# Patient Record
Sex: FEMALE | Race: BLACK OR AFRICAN AMERICAN | NOT HISPANIC OR LATINO
[De-identification: names, ages, dates, MRNs, and addresses within clinical notes are randomized per-mention and may not be internally consistent; named-entity substitution may affect disease eponyms.]

---

## 2018-01-25 ENCOUNTER — APPOINTMENT (OUTPATIENT)
Dept: BARIATRICS | Facility: CLINIC | Age: 33
End: 2018-01-25
Payer: MEDICAID

## 2018-01-25 VITALS
HEIGHT: 66 IN | OXYGEN SATURATION: 99 % | SYSTOLIC BLOOD PRESSURE: 123 MMHG | DIASTOLIC BLOOD PRESSURE: 80 MMHG | BODY MASS INDEX: 47.09 KG/M2 | TEMPERATURE: 98.1 F | HEART RATE: 97 BPM | WEIGHT: 293 LBS

## 2018-01-25 PROCEDURE — 99203 OFFICE O/P NEW LOW 30 MIN: CPT

## 2018-02-14 ENCOUNTER — FORM ENCOUNTER (OUTPATIENT)
Age: 33
End: 2018-02-14

## 2018-02-15 ENCOUNTER — APPOINTMENT (OUTPATIENT)
Dept: BARIATRICS | Facility: CLINIC | Age: 33
End: 2018-02-15

## 2018-02-15 ENCOUNTER — APPOINTMENT (OUTPATIENT)
Dept: RADIOLOGY | Facility: HOSPITAL | Age: 33
End: 2018-02-15

## 2018-02-15 ENCOUNTER — OUTPATIENT (OUTPATIENT)
Dept: OUTPATIENT SERVICES | Facility: HOSPITAL | Age: 33
LOS: 1 days | End: 2018-02-15
Payer: COMMERCIAL

## 2018-02-15 VITALS — HEIGHT: 66 IN | WEIGHT: 293 LBS | BODY MASS INDEX: 47.09 KG/M2

## 2018-02-15 PROCEDURE — 74241: CPT | Mod: 26

## 2018-02-15 PROCEDURE — 74241: CPT

## 2018-03-19 ENCOUNTER — APPOINTMENT (OUTPATIENT)
Dept: BARIATRICS | Facility: CLINIC | Age: 33
End: 2018-03-19

## 2020-07-29 ENCOUNTER — APPOINTMENT (OUTPATIENT)
Dept: BARIATRICS | Facility: CLINIC | Age: 35
End: 2020-07-29
Payer: COMMERCIAL

## 2020-07-29 VITALS — HEIGHT: 66 IN | WEIGHT: 293 LBS | BODY MASS INDEX: 47.09 KG/M2

## 2020-07-29 PROCEDURE — 99214 OFFICE O/P EST MOD 30 MIN: CPT

## 2020-08-06 ENCOUNTER — APPOINTMENT (OUTPATIENT)
Dept: BARIATRICS | Facility: CLINIC | Age: 35
End: 2020-08-06
Payer: COMMERCIAL

## 2020-08-06 VITALS — BODY MASS INDEX: 50.36 KG/M2 | WEIGHT: 293 LBS

## 2020-08-06 DIAGNOSIS — E66.01 MORBID (SEVERE) OBESITY DUE TO EXCESS CALORIES: ICD-10-CM

## 2020-08-06 PROCEDURE — 98968 PH1 ASSMT&MGMT NQHP 21-30: CPT

## 2020-08-19 ENCOUNTER — APPOINTMENT (OUTPATIENT)
Dept: BARIATRICS | Facility: CLINIC | Age: 35
End: 2020-08-19

## 2020-08-30 NOTE — HISTORY OF PRESENT ILLNESS
[Home] : at home, [unfilled] , at the time of the visit. [Medical Office: ([facility])___] : at the medical office located in  [Verbal consent obtained from patient] : the patient, [unfilled] [de-identified] : Patient is a 34 year old female with a long history of morbid obesity not responsive to multiple dietary regimens. She has a BMI of 50.4. she is interested in bariatric surgery.

## 2020-11-01 ENCOUNTER — TRANSCRIPTION ENCOUNTER (OUTPATIENT)
Age: 35
End: 2020-11-01

## 2020-11-02 ENCOUNTER — APPOINTMENT (OUTPATIENT)
Dept: RADIOLOGY | Facility: HOSPITAL | Age: 35
End: 2020-11-02
Payer: COMMERCIAL

## 2020-11-02 ENCOUNTER — RESULT REVIEW (OUTPATIENT)
Age: 35
End: 2020-11-02

## 2020-11-02 ENCOUNTER — OUTPATIENT (OUTPATIENT)
Dept: OUTPATIENT SERVICES | Facility: HOSPITAL | Age: 35
LOS: 1 days | End: 2020-11-02
Payer: COMMERCIAL

## 2020-11-02 PROCEDURE — 74240 X-RAY XM UPR GI TRC 1CNTRST: CPT

## 2020-11-02 PROCEDURE — 74240 X-RAY XM UPR GI TRC 1CNTRST: CPT | Mod: 26

## 2022-06-01 ENCOUNTER — NON-APPOINTMENT (OUTPATIENT)
Age: 37
End: 2022-06-01

## 2022-06-09 ENCOUNTER — APPOINTMENT (OUTPATIENT)
Dept: INTERNAL MEDICINE | Facility: CLINIC | Age: 37
End: 2022-06-09

## 2022-07-07 ENCOUNTER — NON-APPOINTMENT (OUTPATIENT)
Age: 37
End: 2022-07-07

## 2022-07-11 ENCOUNTER — APPOINTMENT (OUTPATIENT)
Dept: INTERNAL MEDICINE | Facility: CLINIC | Age: 37
End: 2022-07-11

## 2022-07-11 VITALS
OXYGEN SATURATION: 96 % | BODY MASS INDEX: 44.41 KG/M2 | SYSTOLIC BLOOD PRESSURE: 125 MMHG | HEIGHT: 68 IN | DIASTOLIC BLOOD PRESSURE: 79 MMHG | TEMPERATURE: 98.6 F | HEART RATE: 80 BPM | WEIGHT: 293 LBS | RESPIRATION RATE: 14 BRPM

## 2022-07-11 DIAGNOSIS — Z11.3 ENCOUNTER FOR SCREENING FOR INFECTIONS WITH A PREDOMINANTLY SEXUAL MODE OF TRANSMISSION: ICD-10-CM

## 2022-07-11 DIAGNOSIS — G89.29 PAIN IN LEFT KNEE: ICD-10-CM

## 2022-07-11 DIAGNOSIS — M53.86 OTHER SPECIFIED DORSOPATHIES, LUMBAR REGION: ICD-10-CM

## 2022-07-11 DIAGNOSIS — M25.562 PAIN IN LEFT KNEE: ICD-10-CM

## 2022-07-11 DIAGNOSIS — Z80.0 FAMILY HISTORY OF MALIGNANT NEOPLASM OF DIGESTIVE ORGANS: ICD-10-CM

## 2022-07-11 DIAGNOSIS — M48.061 SPINAL STENOSIS, LUMBAR REGION WITHOUT NEUROGENIC CLAUDICATION: ICD-10-CM

## 2022-07-11 DIAGNOSIS — G43.009 MIGRAINE W/OUT AURA, NOT INTRACTABLE, W/OUT STATUS MIGRAINOSUS: ICD-10-CM

## 2022-07-11 PROCEDURE — 36415 COLL VENOUS BLD VENIPUNCTURE: CPT

## 2022-07-11 PROCEDURE — 99385 PREV VISIT NEW AGE 18-39: CPT | Mod: 25,GC

## 2022-07-12 ENCOUNTER — TRANSCRIPTION ENCOUNTER (OUTPATIENT)
Age: 37
End: 2022-07-12

## 2022-07-12 LAB
C TRACH RRNA SPEC QL NAA+PROBE: NOT DETECTED
HBV SURFACE AB SER QL: NONREACTIVE
HBV SURFACE AG SER QL: NONREACTIVE
HCV RNA FLD QL NAA+PROBE: NORMAL
HCV RNA SPEC QL PROBE+SIG AMP: NOT DETECTED
HIV1+2 AB SPEC QL IA.RAPID: NONREACTIVE
N GONORRHOEA RRNA SPEC QL NAA+PROBE: NOT DETECTED
SOURCE AMPLIFICATION: NORMAL
T PALLIDUM AB SER QL IA: NEGATIVE

## 2022-07-13 ENCOUNTER — APPOINTMENT (OUTPATIENT)
Dept: BARIATRICS | Facility: CLINIC | Age: 37
End: 2022-07-13

## 2022-07-13 VITALS
DIASTOLIC BLOOD PRESSURE: 72 MMHG | WEIGHT: 293 LBS | HEIGHT: 65.5 IN | SYSTOLIC BLOOD PRESSURE: 137 MMHG | TEMPERATURE: 97.7 F | HEART RATE: 85 BPM | BODY MASS INDEX: 48.23 KG/M2 | OXYGEN SATURATION: 100 %

## 2022-07-13 PROCEDURE — 99203 OFFICE O/P NEW LOW 30 MIN: CPT

## 2022-07-13 NOTE — PLAN
[FreeTextEntry1] : Patient is a 36 year year old F  with a PMHx of obesity (BMI 55.9 ) who presents today for initial bariatric consult. She has been followed by her PCP for a long time. Her current BMI is approximately 55. She has been followed by our office for a lengthy period of time. She has tried to lose weight but has been on stand still and her weight has been increasing since 2020. She is employed and has reasonable support. No evidence of depression and has monetary means to afford supplements. Will go forward with MDS based on her BMI of 56. On exam, she has central obesity and good muscle strength.Patient will see a dietician and our psychiatrist for clearance. Will start bariatric workup. \par

## 2022-07-13 NOTE — END OF VISIT
[FreeTextEntry3] : All medical record entries made by the Scribe were at my, AMBIKA Villa , direction and personally dictated by me on 07/13/2022 . I have reviewed the chart and agree that the record accurately reflects my personal performance of the history, physical exam, assessment and plan. I have also personally directed, reviewed, and agreed with the chart.\par

## 2022-07-13 NOTE — ADDENDUM
[FreeTextEntry1] : Documented by Jose Carlos Watts acting as a scribe for AMBIKA Villa on 07/13/2022\par

## 2022-07-13 NOTE — HISTORY OF PRESENT ILLNESS
[de-identified] : Patient is a 36 year year old F  with a PMHx of obesity (BMI 55.9 ) who presents today for initial bariatric consult. Patient was previously seen by our office few years back. She works as an . Patient is not on any medication except birth control. She has a 13 year old child. Reports of menses once every 3 weeks due to the birth control. Denies reflux.  She has sciatica which she reports causes ankle swelling. She reports that she was losing weight with some diet change but regained after COVID infection last year. She experienced difficulty breathing during COVID. She reports that’s she usually seems to eat less compared to her peers and her diet consists of mostly protein shakes for breakfast and sometimes fast food as well. She exercise at least 2-3 times a week that includes sit-ups and treadmill. \par

## 2022-07-14 LAB
25(OH)D3 SERPL-MCNC: 23.4 NG/ML
ALBUMIN SERPL ELPH-MCNC: 4.5 G/DL
ALP BLD-CCNC: 72 U/L
ALT SERPL-CCNC: 14 U/L
ANION GAP SERPL CALC-SCNC: 14 MMOL/L
APO B SERPL-MCNC: 139 MG/DL
AST SERPL-CCNC: 16 U/L
BASOPHILS # BLD AUTO: 0.05 K/UL
BASOPHILS NFR BLD AUTO: 0.6 %
BILIRUB SERPL-MCNC: 0.3 MG/DL
BUN SERPL-MCNC: 13 MG/DL
CALCIUM SERPL-MCNC: 9.9 MG/DL
CHLORIDE SERPL-SCNC: 102 MMOL/L
CHOLEST SERPL-MCNC: 270 MG/DL
CO2 SERPL-SCNC: 23 MMOL/L
CREAT SERPL-MCNC: 1.02 MG/DL
CRP SERPL-MCNC: 46 MG/L
EGFR: 74 ML/MIN/1.73M2
EOSINOPHIL # BLD AUTO: 0.3 K/UL
EOSINOPHIL NFR BLD AUTO: 3.5 %
ESTIMATED AVERAGE GLUCOSE: 120 MG/DL
FOLATE SERPL-MCNC: 8.2 NG/ML
GLUCOSE BS SERPL-MCNC: 82 MG/DL
GLUCOSE SERPL-MCNC: 83 MG/DL
HBA1C MFR BLD HPLC: 5.8 %
HCT VFR BLD CALC: 39.6 %
HDLC SERPL-MCNC: 59 MG/DL
HGB BLD-MCNC: 12.8 G/DL
IMM GRANULOCYTES NFR BLD AUTO: 0.2 %
INSULIN P FAST SERPL-ACNC: 28.3 UU/ML
IRON SERPL-MCNC: 52 UG/DL
LACTATE BLDA-MCNC: 1.3 MMOL/L
LDLC SERPL CALC-MCNC: 187 MG/DL
LYMPHOCYTES # BLD AUTO: 3.11 K/UL
LYMPHOCYTES NFR BLD AUTO: 36.4 %
MAN DIFF?: NORMAL
MCHC RBC-ENTMCNC: 28.1 PG
MCHC RBC-ENTMCNC: 32.3 GM/DL
MCV RBC AUTO: 87 FL
MONOCYTES # BLD AUTO: 0.47 K/UL
MONOCYTES NFR BLD AUTO: 5.5 %
NEUTROPHILS # BLD AUTO: 4.6 K/UL
NEUTROPHILS NFR BLD AUTO: 53.8 %
NONHDLC SERPL-MCNC: 211 MG/DL
PLATELET # BLD AUTO: 405 K/UL
POTASSIUM SERPL-SCNC: 4.4 MMOL/L
PROT SERPL-MCNC: 7.4 G/DL
RBC # BLD: 4.55 M/UL
RBC # FLD: 14.4 %
SODIUM SERPL-SCNC: 139 MMOL/L
TRIGL SERPL-MCNC: 118 MG/DL
TSH SERPL-ACNC: 1.95 UIU/ML
URATE SERPL-MCNC: 6.5 MG/DL
VIT B12 SERPL-MCNC: 620 PG/ML
WBC # FLD AUTO: 8.55 K/UL

## 2022-07-15 LAB — APO LP(A) SERPL-MCNC: 9.4 NMOL/L

## 2022-07-19 LAB — VIT A SERPL-MCNC: 59.3 UG/DL

## 2022-07-22 ENCOUNTER — NON-APPOINTMENT (OUTPATIENT)
Age: 37
End: 2022-07-22

## 2022-07-22 DIAGNOSIS — R79.89 OTHER SPECIFIED ABNORMAL FINDINGS OF BLOOD CHEMISTRY: ICD-10-CM

## 2022-07-22 RX ORDER — ERGOCALCIFEROL 1.25 MG/1
1.25 MG CAPSULE, LIQUID FILLED ORAL
Qty: 12 | Refills: 0 | Status: ACTIVE | COMMUNITY
Start: 2022-07-22 | End: 1900-01-01

## 2022-07-25 LAB — VIT B1 SERPL-MCNC: 121.7 NMOL/L

## 2022-08-03 ENCOUNTER — APPOINTMENT (OUTPATIENT)
Dept: RADIOLOGY | Facility: HOSPITAL | Age: 37
End: 2022-08-03

## 2022-08-03 ENCOUNTER — OUTPATIENT (OUTPATIENT)
Dept: OUTPATIENT SERVICES | Facility: HOSPITAL | Age: 37
LOS: 1 days | End: 2022-08-03
Payer: COMMERCIAL

## 2022-08-03 ENCOUNTER — APPOINTMENT (OUTPATIENT)
Dept: INTERNAL MEDICINE | Facility: CLINIC | Age: 37
End: 2022-08-03

## 2022-08-03 PROCEDURE — 74240 X-RAY XM UPR GI TRC 1CNTRST: CPT

## 2022-08-03 PROCEDURE — 74240 X-RAY XM UPR GI TRC 1CNTRST: CPT | Mod: 26

## 2022-08-08 ENCOUNTER — NON-APPOINTMENT (OUTPATIENT)
Age: 37
End: 2022-08-08

## 2022-08-08 ENCOUNTER — APPOINTMENT (OUTPATIENT)
Dept: BARIATRICS | Facility: CLINIC | Age: 37
End: 2022-08-08

## 2022-08-15 ENCOUNTER — APPOINTMENT (OUTPATIENT)
Dept: INTERNAL MEDICINE | Facility: CLINIC | Age: 37
End: 2022-08-15

## 2022-08-16 ENCOUNTER — APPOINTMENT (OUTPATIENT)
Dept: BARIATRICS | Facility: CLINIC | Age: 37
End: 2022-08-16

## 2022-08-16 VITALS — WEIGHT: 293 LBS | BODY MASS INDEX: 53.75 KG/M2

## 2022-09-15 ENCOUNTER — APPOINTMENT (OUTPATIENT)
Dept: BARIATRICS | Facility: CLINIC | Age: 37
End: 2022-09-15

## 2022-09-15 VITALS — BODY MASS INDEX: 53.1 KG/M2 | WEIGHT: 293 LBS

## 2022-09-21 ENCOUNTER — OUTPATIENT (OUTPATIENT)
Dept: OUTPATIENT SERVICES | Facility: HOSPITAL | Age: 37
LOS: 1 days | End: 2022-09-21
Payer: COMMERCIAL

## 2022-09-21 ENCOUNTER — RESULT REVIEW (OUTPATIENT)
Age: 37
End: 2022-09-21

## 2022-09-21 DIAGNOSIS — Z01.818 ENCOUNTER FOR OTHER PREPROCEDURAL EXAMINATION: ICD-10-CM

## 2022-09-21 LAB
ALBUMIN SERPL ELPH-MCNC: 4.5 G/DL — SIGNIFICANT CHANGE UP (ref 3.3–5)
ALP SERPL-CCNC: 92 U/L — SIGNIFICANT CHANGE UP (ref 40–120)
ALT FLD-CCNC: 18 U/L — SIGNIFICANT CHANGE UP (ref 10–45)
ANION GAP SERPL CALC-SCNC: 10 MMOL/L — SIGNIFICANT CHANGE UP (ref 5–17)
APPEARANCE UR: CLEAR — SIGNIFICANT CHANGE UP
APTT BLD: 32.1 SEC — SIGNIFICANT CHANGE UP (ref 27.5–35.5)
AST SERPL-CCNC: 18 U/L — SIGNIFICANT CHANGE UP (ref 10–40)
BASOPHILS # BLD AUTO: 0.03 K/UL — SIGNIFICANT CHANGE UP (ref 0–0.2)
BASOPHILS NFR BLD AUTO: 0.4 % — SIGNIFICANT CHANGE UP (ref 0–2)
BILIRUB SERPL-MCNC: 0.5 MG/DL — SIGNIFICANT CHANGE UP (ref 0.2–1.2)
BILIRUB UR-MCNC: NEGATIVE — SIGNIFICANT CHANGE UP
BUN SERPL-MCNC: 10 MG/DL — SIGNIFICANT CHANGE UP (ref 7–23)
CALCIUM SERPL-MCNC: 9.6 MG/DL — SIGNIFICANT CHANGE UP (ref 8.4–10.5)
CHLORIDE SERPL-SCNC: 104 MMOL/L — SIGNIFICANT CHANGE UP (ref 96–108)
CO2 SERPL-SCNC: 26 MMOL/L — SIGNIFICANT CHANGE UP (ref 22–31)
COLOR SPEC: YELLOW — SIGNIFICANT CHANGE UP
CREAT SERPL-MCNC: 0.96 MG/DL — SIGNIFICANT CHANGE UP (ref 0.5–1.3)
DIFF PNL FLD: NEGATIVE — SIGNIFICANT CHANGE UP
EGFR: 79 ML/MIN/1.73M2 — SIGNIFICANT CHANGE UP
EOSINOPHIL # BLD AUTO: 0.25 K/UL — SIGNIFICANT CHANGE UP (ref 0–0.5)
EOSINOPHIL NFR BLD AUTO: 3.4 % — SIGNIFICANT CHANGE UP (ref 0–6)
GLUCOSE SERPL-MCNC: 90 MG/DL — SIGNIFICANT CHANGE UP (ref 70–99)
GLUCOSE UR QL: NEGATIVE — SIGNIFICANT CHANGE UP
HCT VFR BLD CALC: 38.5 % — SIGNIFICANT CHANGE UP (ref 34.5–45)
HGB BLD-MCNC: 12.6 G/DL — SIGNIFICANT CHANGE UP (ref 11.5–15.5)
IMM GRANULOCYTES NFR BLD AUTO: 0.3 % — SIGNIFICANT CHANGE UP (ref 0–0.9)
INR BLD: 1.07 — SIGNIFICANT CHANGE UP (ref 0.88–1.16)
KETONES UR-MCNC: NEGATIVE — SIGNIFICANT CHANGE UP
LEUKOCYTE ESTERASE UR-ACNC: NEGATIVE — SIGNIFICANT CHANGE UP
LYMPHOCYTES # BLD AUTO: 2.26 K/UL — SIGNIFICANT CHANGE UP (ref 1–3.3)
LYMPHOCYTES # BLD AUTO: 31 % — SIGNIFICANT CHANGE UP (ref 13–44)
MCHC RBC-ENTMCNC: 28.4 PG — SIGNIFICANT CHANGE UP (ref 27–34)
MCHC RBC-ENTMCNC: 32.7 GM/DL — SIGNIFICANT CHANGE UP (ref 32–36)
MCV RBC AUTO: 86.7 FL — SIGNIFICANT CHANGE UP (ref 80–100)
MONOCYTES # BLD AUTO: 0.54 K/UL — SIGNIFICANT CHANGE UP (ref 0–0.9)
MONOCYTES NFR BLD AUTO: 7.4 % — SIGNIFICANT CHANGE UP (ref 2–14)
NEUTROPHILS # BLD AUTO: 4.2 K/UL — SIGNIFICANT CHANGE UP (ref 1.8–7.4)
NEUTROPHILS NFR BLD AUTO: 57.5 % — SIGNIFICANT CHANGE UP (ref 43–77)
NITRITE UR-MCNC: NEGATIVE — SIGNIFICANT CHANGE UP
NRBC # BLD: 0 /100 WBCS — SIGNIFICANT CHANGE UP (ref 0–0)
PH UR: 6 — SIGNIFICANT CHANGE UP (ref 5–8)
PLATELET # BLD AUTO: 395 K/UL — SIGNIFICANT CHANGE UP (ref 150–400)
POTASSIUM SERPL-MCNC: 5.1 MMOL/L — SIGNIFICANT CHANGE UP (ref 3.5–5.3)
POTASSIUM SERPL-SCNC: 5.1 MMOL/L — SIGNIFICANT CHANGE UP (ref 3.5–5.3)
PROT SERPL-MCNC: 6.9 G/DL — SIGNIFICANT CHANGE UP (ref 6–8.3)
PROT UR-MCNC: NEGATIVE MG/DL — SIGNIFICANT CHANGE UP
PROTHROM AB SERPL-ACNC: 12.7 SEC — SIGNIFICANT CHANGE UP (ref 10.5–13.4)
RBC # BLD: 4.44 M/UL — SIGNIFICANT CHANGE UP (ref 3.8–5.2)
RBC # FLD: 13.6 % — SIGNIFICANT CHANGE UP (ref 10.3–14.5)
SODIUM SERPL-SCNC: 140 MMOL/L — SIGNIFICANT CHANGE UP (ref 135–145)
SP GR SPEC: 1.01 — SIGNIFICANT CHANGE UP (ref 1–1.03)
UROBILINOGEN FLD QL: 0.2 E.U./DL — SIGNIFICANT CHANGE UP
WBC # BLD: 7.3 K/UL — SIGNIFICANT CHANGE UP (ref 3.8–10.5)
WBC # FLD AUTO: 7.3 K/UL — SIGNIFICANT CHANGE UP (ref 3.8–10.5)

## 2022-09-21 PROCEDURE — 85730 THROMBOPLASTIN TIME PARTIAL: CPT

## 2022-09-21 PROCEDURE — 71046 X-RAY EXAM CHEST 2 VIEWS: CPT

## 2022-09-21 PROCEDURE — 93005 ELECTROCARDIOGRAM TRACING: CPT

## 2022-09-21 PROCEDURE — 81003 URINALYSIS AUTO W/O SCOPE: CPT

## 2022-09-21 PROCEDURE — 85610 PROTHROMBIN TIME: CPT

## 2022-09-21 PROCEDURE — 87086 URINE CULTURE/COLONY COUNT: CPT

## 2022-09-21 PROCEDURE — 71046 X-RAY EXAM CHEST 2 VIEWS: CPT | Mod: 26

## 2022-09-21 PROCEDURE — 85025 COMPLETE CBC W/AUTO DIFF WBC: CPT

## 2022-09-21 PROCEDURE — 93010 ELECTROCARDIOGRAM REPORT: CPT

## 2022-09-21 PROCEDURE — 80053 COMPREHEN METABOLIC PANEL: CPT

## 2022-09-22 LAB
CULTURE RESULTS: NO GROWTH — SIGNIFICANT CHANGE UP
SPECIMEN SOURCE: SIGNIFICANT CHANGE UP

## 2022-10-11 ENCOUNTER — APPOINTMENT (OUTPATIENT)
Dept: INTERNAL MEDICINE | Facility: CLINIC | Age: 37
End: 2022-10-11

## 2022-10-11 VITALS
SYSTOLIC BLOOD PRESSURE: 123 MMHG | WEIGHT: 293 LBS | OXYGEN SATURATION: 100 % | BODY MASS INDEX: 48.23 KG/M2 | HEART RATE: 83 BPM | HEIGHT: 65.5 IN | DIASTOLIC BLOOD PRESSURE: 83 MMHG | TEMPERATURE: 96 F

## 2022-10-11 PROCEDURE — 99213 OFFICE O/P EST LOW 20 MIN: CPT | Mod: GC

## 2022-10-11 RX ORDER — LEVONORGESTREL / ETHINYL ESTRADIOL AND ETHINYL ESTRADIOL 150-30(84)
0.15-0.03 &0.01 KIT ORAL
Refills: 0 | Status: ACTIVE | COMMUNITY
Start: 2022-10-11

## 2022-10-11 RX ORDER — RIZATRIPTAN BENZOATE 10 MG/1
10 TABLET ORAL
Qty: 90 | Refills: 0 | Status: DISCONTINUED | COMMUNITY
Start: 2022-07-11 | End: 2022-10-11

## 2022-10-24 ENCOUNTER — NON-APPOINTMENT (OUTPATIENT)
Age: 37
End: 2022-10-24

## 2022-10-26 ENCOUNTER — APPOINTMENT (OUTPATIENT)
Dept: BARIATRICS | Facility: CLINIC | Age: 37
End: 2022-10-26

## 2022-10-27 ENCOUNTER — APPOINTMENT (OUTPATIENT)
Dept: INTERNAL MEDICINE | Facility: CLINIC | Age: 37
End: 2022-10-27

## 2022-10-27 ENCOUNTER — APPOINTMENT (OUTPATIENT)
Dept: BARIATRICS | Facility: CLINIC | Age: 37
End: 2022-10-27

## 2022-10-27 VITALS — WEIGHT: 293 LBS | HEIGHT: 65.5 IN | BODY MASS INDEX: 48.23 KG/M2

## 2022-10-27 PROCEDURE — 99213 OFFICE O/P EST LOW 20 MIN: CPT | Mod: 95

## 2022-10-27 PROCEDURE — PCNS1: CPT

## 2022-11-07 ENCOUNTER — TRANSCRIPTION ENCOUNTER (OUTPATIENT)
Age: 37
End: 2022-11-07

## 2022-11-07 VITALS
SYSTOLIC BLOOD PRESSURE: 120 MMHG | HEIGHT: 66 IN | OXYGEN SATURATION: 99 % | DIASTOLIC BLOOD PRESSURE: 77 MMHG | TEMPERATURE: 98 F | RESPIRATION RATE: 16 BRPM | HEART RATE: 81 BPM | WEIGHT: 293 LBS

## 2022-11-07 NOTE — PATIENT PROFILE ADULT - FALL HARM RISK - UNIVERSAL INTERVENTIONS
Bed in lowest position, wheels locked, appropriate side rails in place/Call bell, personal items and telephone in reach/Instruct patient to call for assistance before getting out of bed or chair/Non-slip footwear when patient is out of bed/[place] to call system/Physically safe environment - no spills, clutter or unnecessary equipment/Purposeful Proactive Rounding/Room/bathroom lighting operational, light cord in reach

## 2022-11-08 ENCOUNTER — INPATIENT (INPATIENT)
Facility: HOSPITAL | Age: 37
LOS: 1 days | Discharge: HOME | DRG: 621 | End: 2022-11-10
Attending: SURGERY | Admitting: SURGERY
Payer: COMMERCIAL

## 2022-11-08 ENCOUNTER — RESULT REVIEW (OUTPATIENT)
Age: 37
End: 2022-11-08

## 2022-11-08 ENCOUNTER — APPOINTMENT (OUTPATIENT)
Dept: BARIATRICS | Facility: HOSPITAL | Age: 37
End: 2022-11-08

## 2022-11-08 DIAGNOSIS — E66.01 MORBID (SEVERE) OBESITY DUE TO EXCESS CALORIES: ICD-10-CM

## 2022-11-08 LAB
BLD GP AB SCN SERPL QL: NEGATIVE — SIGNIFICANT CHANGE UP
HCT VFR BLD CALC: 39 % — SIGNIFICANT CHANGE UP (ref 34.5–45)
HGB BLD-MCNC: 12.7 G/DL — SIGNIFICANT CHANGE UP (ref 11.5–15.5)
MCHC RBC-ENTMCNC: 28.3 PG — SIGNIFICANT CHANGE UP (ref 27–34)
MCHC RBC-ENTMCNC: 32.6 GM/DL — SIGNIFICANT CHANGE UP (ref 32–36)
MCV RBC AUTO: 86.9 FL — SIGNIFICANT CHANGE UP (ref 80–100)
NRBC # BLD: 0 /100 WBCS — SIGNIFICANT CHANGE UP (ref 0–0)
PLATELET # BLD AUTO: 345 K/UL — SIGNIFICANT CHANGE UP (ref 150–400)
RBC # BLD: 4.49 M/UL — SIGNIFICANT CHANGE UP (ref 3.8–5.2)
RBC # FLD: 12.9 % — SIGNIFICANT CHANGE UP (ref 10.3–14.5)
RH IG SCN BLD-IMP: POSITIVE — SIGNIFICANT CHANGE UP
WBC # BLD: 15.04 K/UL — HIGH (ref 3.8–10.5)
WBC # FLD AUTO: 15.04 K/UL — HIGH (ref 3.8–10.5)

## 2022-11-08 PROCEDURE — 88307 TISSUE EXAM BY PATHOLOGIST: CPT | Mod: 26

## 2022-11-08 PROCEDURE — 43845 GASTROPLASTY DUODENAL SWITCH: CPT

## 2022-11-08 PROCEDURE — 39541 REPAIR OF DIAPHRAGM HERNIA: CPT

## 2022-11-08 DEVICE — STAPLER ETHICON ECHELON ENDOPATH 60MM: Type: IMPLANTABLE DEVICE | Status: FUNCTIONAL

## 2022-11-08 DEVICE — STAPLER ETHICON GST ECHELON 60MM GREEN RELOAD: Type: IMPLANTABLE DEVICE | Status: FUNCTIONAL

## 2022-11-08 DEVICE — STAPLER ETHICON ECHELON ENDOPATH GRIP SURFACE 60MM WHITE RELOAD: Type: IMPLANTABLE DEVICE | Status: FUNCTIONAL

## 2022-11-08 DEVICE — STAPLER ETHICON GST ECHELON 60MM BLUE RELOAD: Type: IMPLANTABLE DEVICE | Status: FUNCTIONAL

## 2022-11-08 DEVICE — CLIP APPLIER ETHICON LIGAMAX 5MM: Type: IMPLANTABLE DEVICE | Status: FUNCTIONAL

## 2022-11-08 RX ORDER — INFLUENZA VIRUS VACCINE 15; 15; 15; 15 UG/.5ML; UG/.5ML; UG/.5ML; UG/.5ML
0.5 SUSPENSION INTRAMUSCULAR ONCE
Refills: 0 | Status: DISCONTINUED | OUTPATIENT
Start: 2022-11-08 | End: 2022-11-10

## 2022-11-08 RX ORDER — ACETAMINOPHEN 500 MG
650 TABLET ORAL EVERY 6 HOURS
Refills: 0 | Status: DISCONTINUED | OUTPATIENT
Start: 2022-11-08 | End: 2022-11-10

## 2022-11-08 RX ORDER — PANTOPRAZOLE SODIUM 20 MG/1
40 TABLET, DELAYED RELEASE ORAL DAILY
Refills: 0 | Status: DISCONTINUED | OUTPATIENT
Start: 2022-11-08 | End: 2022-11-10

## 2022-11-08 RX ORDER — SCOPALAMINE 1 MG/3D
1 PATCH, EXTENDED RELEASE TRANSDERMAL
Refills: 0 | Status: DISCONTINUED | OUTPATIENT
Start: 2022-11-08 | End: 2022-11-08

## 2022-11-08 RX ORDER — ACETAMINOPHEN 500 MG
1000 TABLET ORAL ONCE
Refills: 0 | Status: COMPLETED | OUTPATIENT
Start: 2022-11-08 | End: 2022-11-08

## 2022-11-08 RX ORDER — CHOLECALCIFEROL (VITAMIN D3) 125 MCG
1 CAPSULE ORAL
Qty: 0 | Refills: 0 | DISCHARGE

## 2022-11-08 RX ORDER — CEFAZOLIN SODIUM 1 G
500 VIAL (EA) INJECTION ONCE
Refills: 0 | Status: COMPLETED | OUTPATIENT
Start: 2022-11-08 | End: 2022-11-09

## 2022-11-08 RX ORDER — KETOROLAC TROMETHAMINE 30 MG/ML
15 SYRINGE (ML) INJECTION EVERY 6 HOURS
Refills: 0 | Status: DISCONTINUED | OUTPATIENT
Start: 2022-11-08 | End: 2022-11-10

## 2022-11-08 RX ORDER — RIZATRIPTAN BENZOATE 5 MG/1
1 TABLET ORAL
Qty: 0 | Refills: 0 | DISCHARGE

## 2022-11-08 RX ORDER — ONDANSETRON 8 MG/1
4 TABLET, FILM COATED ORAL EVERY 8 HOURS
Refills: 0 | Status: DISCONTINUED | OUTPATIENT
Start: 2022-11-08 | End: 2022-11-10

## 2022-11-08 RX ORDER — SODIUM CHLORIDE 9 MG/ML
1000 INJECTION, SOLUTION INTRAVENOUS
Refills: 0 | Status: DISCONTINUED | OUTPATIENT
Start: 2022-11-08 | End: 2022-11-10

## 2022-11-08 RX ORDER — HYDROMORPHONE HYDROCHLORIDE 2 MG/ML
0.5 INJECTION INTRAMUSCULAR; INTRAVENOUS; SUBCUTANEOUS
Refills: 0 | Status: DISCONTINUED | OUTPATIENT
Start: 2022-11-08 | End: 2022-11-08

## 2022-11-08 RX ORDER — ENOXAPARIN SODIUM 100 MG/ML
30 INJECTION SUBCUTANEOUS ONCE
Refills: 0 | Status: COMPLETED | OUTPATIENT
Start: 2022-11-08 | End: 2022-11-08

## 2022-11-08 RX ADMIN — HYDROMORPHONE HYDROCHLORIDE 0.5 MILLIGRAM(S): 2 INJECTION INTRAMUSCULAR; INTRAVENOUS; SUBCUTANEOUS at 14:07

## 2022-11-08 RX ADMIN — HYDROMORPHONE HYDROCHLORIDE 0.5 MILLIGRAM(S): 2 INJECTION INTRAMUSCULAR; INTRAVENOUS; SUBCUTANEOUS at 16:07

## 2022-11-08 RX ADMIN — Medication 15 MILLIGRAM(S): at 18:39

## 2022-11-08 RX ADMIN — SCOPALAMINE 1 PATCH: 1 PATCH, EXTENDED RELEASE TRANSDERMAL at 18:39

## 2022-11-08 RX ADMIN — HYDROMORPHONE HYDROCHLORIDE 0.5 MILLIGRAM(S): 2 INJECTION INTRAMUSCULAR; INTRAVENOUS; SUBCUTANEOUS at 13:52

## 2022-11-08 RX ADMIN — PANTOPRAZOLE SODIUM 40 MILLIGRAM(S): 20 TABLET, DELAYED RELEASE ORAL at 12:43

## 2022-11-08 RX ADMIN — SODIUM CHLORIDE 165 MILLILITER(S): 9 INJECTION, SOLUTION INTRAVENOUS at 12:43

## 2022-11-08 RX ADMIN — ENOXAPARIN SODIUM 30 MILLIGRAM(S): 100 INJECTION SUBCUTANEOUS at 07:03

## 2022-11-08 RX ADMIN — Medication 15 MILLIGRAM(S): at 23:30

## 2022-11-08 RX ADMIN — Medication 15 MILLIGRAM(S): at 13:15

## 2022-11-08 RX ADMIN — HYDROMORPHONE HYDROCHLORIDE 0.5 MILLIGRAM(S): 2 INJECTION INTRAMUSCULAR; INTRAVENOUS; SUBCUTANEOUS at 15:27

## 2022-11-08 RX ADMIN — Medication 15 MILLIGRAM(S): at 18:36

## 2022-11-08 RX ADMIN — HYDROMORPHONE HYDROCHLORIDE 0.5 MILLIGRAM(S): 2 INJECTION INTRAMUSCULAR; INTRAVENOUS; SUBCUTANEOUS at 19:12

## 2022-11-08 RX ADMIN — HYDROMORPHONE HYDROCHLORIDE 0.5 MILLIGRAM(S): 2 INJECTION INTRAMUSCULAR; INTRAVENOUS; SUBCUTANEOUS at 19:36

## 2022-11-08 RX ADMIN — Medication 15 MILLIGRAM(S): at 12:43

## 2022-11-08 RX ADMIN — Medication 1000 MILLIGRAM(S): at 22:12

## 2022-11-08 RX ADMIN — Medication 1000 MILLIGRAM(S): at 07:04

## 2022-11-08 RX ADMIN — SCOPALAMINE 1 PATCH: 1 PATCH, EXTENDED RELEASE TRANSDERMAL at 07:03

## 2022-11-08 RX ADMIN — Medication 400 MILLIGRAM(S): at 21:42

## 2022-11-08 NOTE — H&P ADULT - ASSESSMENT
36 y/o female patient with sciatica, refractory morbid obesity (BMI 51) and vitamin deficiencies; comes in for bariatric surgery as means to improve her overall health.  Denies any other complains such as reflux, persistent nausea or BOB.     Plan: To OR for Lap MDS and admission to [facility] for perioperative care.

## 2022-11-08 NOTE — BRIEF OPERATIVE NOTE - NSICDXBRIEFPROCEDURE_GEN_ALL_CORE_FT
PROCEDURES:  Laparoscopic loop duodenal switch 08-Nov-2022 12:00:49  Bibiana Nolasco  Laparoscopic herniorrhaphy of hiatal hernia 08-Nov-2022 12:01:23  Bibiana Nolasco

## 2022-11-08 NOTE — BRIEF OPERATIVE NOTE - NSICDXBRIEFPOSTOP_GEN_ALL_CORE_FT
POST-OP DIAGNOSIS:  Morbid obesity 08-Nov-2022 12:01:35  Bibiana Nolasco  Hiatal hernia 08-Nov-2022 12:01:49  Bibiana Nolasco

## 2022-11-08 NOTE — BRIEF OPERATIVE NOTE - OPERATION/FINDINGS
Laparoscopic modified duodenal switch with hiatal hernia repair: Antibiotic and DVT prophylaxis on board. Prepped in sterile fashion. Pneumoperitoneum attained and all trocars placed without complication. Large floppy liver. Large fatty falciform ligament. Large hiatal hernia: repaired primarily after mobilization of at least 4cm of intra-abdominal esophagus. Bilateral vagus protected. Gastrocolic ligament and short gastrics taken down with Ligasure as well as stomach posterior adhesions. Duodenal area dissected and transected leaving adequate stump and cuff. Sleeve constructed over 42Fr bougie. Duodenoileostomy created tension free at 300cm from terminal ileum. Leak test negative. Specimen removed. Supraumbilical fascia closed with Vircryl. Hemostasis confirmed. Skin closed with monocryl and dermabond. Patient tolerated procedure well and was sent to recovery room in stable condition. Some subcutaneous emphysema noted extending up to face; no hemodynamic compromise as patient stable. It's from hiatal hernia dissection and it will resolve on its own.

## 2022-11-08 NOTE — H&P ADULT - HISTORY OF PRESENT ILLNESS
36 y/o female patient with sciatica, refractory morbid obesity (BMI 51) and vitamin deficiencies; comes in for bariatric surgery as means to improve her overall health.  Denies any other complains such as reflux, persistent nausea or BOB.     Home meds: OCP's  ALL: NKDA  PSx:  Toxic:    Physical Exam:  - Affect: adequate  - GEN: AOOX3, NAD  - ABD: soft and depressible, nontender, nondistended. Large obese abdomen.     Labs:  Hgb:  Hct:  Cr:    CXR:  36 y/o female patient with sciatica, refractory morbid obesity (BMI 51) and vitamin deficiencies; comes in for bariatric surgery as means to improve her overall health.  Denies any other complains such as reflux, persistent nausea or BOB.     Home meds: OCP's  ALL: NKDA  PSx:  Toxic: denies     Physical Exam:  - Affect: adequate  - GEN: AOOX3, NAD  - ABD: soft and depressible, nontender, nondistended. Large obese abdomen.     Labs:  Hgb:  Hct:  Cr:    CXR:   UGI: normal as per report 36 y/o female patient with sciatica, refractory morbid obesity (BMI 51) and vitamin deficiencies; comes in for bariatric surgery as means to improve her overall health.  Denies any other complains such as reflux, persistent nausea or BOB.     Home meds: OCP's (stopped a month ago)  ALL: NKDA  PSx: denies  Toxic: denies     Physical Exam:  - Affect: adequate  - GEN: AOOX3, NAD  - ABD: soft and depressible, nontender, nondistended. Large obese abdomen.     Labs:  Hgb:12.6  Hct:38.5  Cr:0.96    CXR: WNL  UGI: normal as per report

## 2022-11-08 NOTE — CHART NOTE - NSCHARTNOTEFT_GEN_A_CORE
Procedure: modified duodenal switch  Surgeon: sam    S: Pt has no complaints. Some incisional soreness. No nause/vomiting. Denies CP, SOB, ALEXANDER, calf tenderness.     O:  T(C): 36.2 (11-08-22 @ 11:43), Max: 36.2 (11-08-22 @ 11:43)  T(F): 97.1 (11-08-22 @ 11:43), Max: 97.1 (11-08-22 @ 11:43)  HR: 98 (11-08-22 @ 13:00) (94 - 102)  BP: 145/83 (11-08-22 @ 13:00) (123/63 - 145/86)  RR: 20 (11-08-22 @ 13:00) (20 - 22)  SpO2: 100% (11-08-22 @ 13:00) (97% - 100%)  Wt(kg): --            Gen: NAD, resting comfortably in bed  C/V: NSR  Pulm: Nonlabored breathing, no respiratory distress  Abd: soft, non distended, appropriate incisional tenderness, no rebound/guarding, incisions c/d/i  Extrem: WWP, no calf edema, SCDs in place      A/P: 37yFemale s/p above procedure  -Regional  -BCLD/IVFs  -Pain/nausea mx  -OOBA/IS  -Nutrition AM  -Routine labs  -Monitor SubQ emphysema resolution

## 2022-11-08 NOTE — H&P ADULT - NSHPPHYSICALEXAM_GEN_ALL_CORE
Physical Exam:  - Affect: adequate  - GEN: AOOX3, NAD  - ABD: soft and depressible, nontender, nondistended. Large obese abdomen.

## 2022-11-08 NOTE — H&P ADULT - REASON FOR ADMISSION
36 y/o female patient with sciatica, refractory morbid obesity (BMI 51) and vitamin deficiencies; comes in for bariatric surgery as means to improve her overall health.

## 2022-11-08 NOTE — PROVIDER CONTACT NOTE (CHANGE IN STATUS NOTIFICATION) - ASSESSMENT
left face - lower eyelid, cheek, neck crepitus palpable. Team 2 MD & anesthesia at bedside - no Xray needed & patient will still go to regional floor

## 2022-11-09 ENCOUNTER — TRANSCRIPTION ENCOUNTER (OUTPATIENT)
Age: 37
End: 2022-11-09

## 2022-11-09 LAB
ANION GAP SERPL CALC-SCNC: 10 MMOL/L — SIGNIFICANT CHANGE UP (ref 5–17)
BASOPHILS # BLD AUTO: 0.01 K/UL — SIGNIFICANT CHANGE UP (ref 0–0.2)
BASOPHILS NFR BLD AUTO: 0.1 % — SIGNIFICANT CHANGE UP (ref 0–2)
BUN SERPL-MCNC: 10 MG/DL — SIGNIFICANT CHANGE UP (ref 7–23)
CALCIUM SERPL-MCNC: 9.2 MG/DL — SIGNIFICANT CHANGE UP (ref 8.4–10.5)
CHLORIDE SERPL-SCNC: 102 MMOL/L — SIGNIFICANT CHANGE UP (ref 96–108)
CO2 SERPL-SCNC: 25 MMOL/L — SIGNIFICANT CHANGE UP (ref 22–31)
CREAT SERPL-MCNC: 0.99 MG/DL — SIGNIFICANT CHANGE UP (ref 0.5–1.3)
EGFR: 76 ML/MIN/1.73M2 — SIGNIFICANT CHANGE UP
EOSINOPHIL # BLD AUTO: 0.01 K/UL — SIGNIFICANT CHANGE UP (ref 0–0.5)
EOSINOPHIL NFR BLD AUTO: 0.1 % — SIGNIFICANT CHANGE UP (ref 0–6)
GLUCOSE SERPL-MCNC: 109 MG/DL — HIGH (ref 70–99)
HCT VFR BLD CALC: 36.4 % — SIGNIFICANT CHANGE UP (ref 34.5–45)
HGB BLD-MCNC: 11.9 G/DL — SIGNIFICANT CHANGE UP (ref 11.5–15.5)
IMM GRANULOCYTES NFR BLD AUTO: 0.3 % — SIGNIFICANT CHANGE UP (ref 0–0.9)
LYMPHOCYTES # BLD AUTO: 1.52 K/UL — SIGNIFICANT CHANGE UP (ref 1–3.3)
LYMPHOCYTES # BLD AUTO: 10.3 % — LOW (ref 13–44)
MAGNESIUM SERPL-MCNC: 1.9 MG/DL — SIGNIFICANT CHANGE UP (ref 1.6–2.6)
MCHC RBC-ENTMCNC: 28.7 PG — SIGNIFICANT CHANGE UP (ref 27–34)
MCHC RBC-ENTMCNC: 32.7 GM/DL — SIGNIFICANT CHANGE UP (ref 32–36)
MCV RBC AUTO: 87.7 FL — SIGNIFICANT CHANGE UP (ref 80–100)
MONOCYTES # BLD AUTO: 1.41 K/UL — HIGH (ref 0–0.9)
MONOCYTES NFR BLD AUTO: 9.6 % — SIGNIFICANT CHANGE UP (ref 2–14)
NEUTROPHILS # BLD AUTO: 11.69 K/UL — HIGH (ref 1.8–7.4)
NEUTROPHILS NFR BLD AUTO: 79.6 % — HIGH (ref 43–77)
NRBC # BLD: 0 /100 WBCS — SIGNIFICANT CHANGE UP (ref 0–0)
PHOSPHATE SERPL-MCNC: 3.3 MG/DL — SIGNIFICANT CHANGE UP (ref 2.5–4.5)
PLATELET # BLD AUTO: 345 K/UL — SIGNIFICANT CHANGE UP (ref 150–400)
POTASSIUM SERPL-MCNC: 4.5 MMOL/L — SIGNIFICANT CHANGE UP (ref 3.5–5.3)
POTASSIUM SERPL-SCNC: 4.5 MMOL/L — SIGNIFICANT CHANGE UP (ref 3.5–5.3)
RBC # BLD: 4.15 M/UL — SIGNIFICANT CHANGE UP (ref 3.8–5.2)
RBC # FLD: 13 % — SIGNIFICANT CHANGE UP (ref 10.3–14.5)
SODIUM SERPL-SCNC: 137 MMOL/L — SIGNIFICANT CHANGE UP (ref 135–145)
WBC # BLD: 14.69 K/UL — HIGH (ref 3.8–10.5)
WBC # FLD AUTO: 14.69 K/UL — HIGH (ref 3.8–10.5)

## 2022-11-09 RX ORDER — APIXABAN 2.5 MG/1
1 TABLET, FILM COATED ORAL
Qty: 60 | Refills: 0
Start: 2022-11-09 | End: 2022-12-08

## 2022-11-09 RX ORDER — OMEPRAZOLE 10 MG/1
1 CAPSULE, DELAYED RELEASE ORAL
Qty: 30 | Refills: 0
Start: 2022-11-09 | End: 2022-12-08

## 2022-11-09 RX ORDER — MAGNESIUM SULFATE 500 MG/ML
1 VIAL (ML) INJECTION ONCE
Refills: 0 | Status: COMPLETED | OUTPATIENT
Start: 2022-11-09 | End: 2022-11-09

## 2022-11-09 RX ORDER — ACETAMINOPHEN 500 MG
20 TABLET ORAL
Qty: 560 | Refills: 0
Start: 2022-11-09 | End: 2022-11-15

## 2022-11-09 RX ADMIN — ONDANSETRON 4 MILLIGRAM(S): 8 TABLET, FILM COATED ORAL at 03:25

## 2022-11-09 RX ADMIN — SODIUM CHLORIDE 80 MILLILITER(S): 9 INJECTION, SOLUTION INTRAVENOUS at 11:58

## 2022-11-09 RX ADMIN — Medication 100 MILLIGRAM(S): at 00:21

## 2022-11-09 RX ADMIN — SCOPALAMINE 1 PATCH: 1 PATCH, EXTENDED RELEASE TRANSDERMAL at 18:05

## 2022-11-09 RX ADMIN — Medication 15 MILLIGRAM(S): at 18:20

## 2022-11-09 RX ADMIN — PANTOPRAZOLE SODIUM 40 MILLIGRAM(S): 20 TABLET, DELAYED RELEASE ORAL at 11:59

## 2022-11-09 RX ADMIN — Medication 15 MILLIGRAM(S): at 06:37

## 2022-11-09 RX ADMIN — SODIUM CHLORIDE 80 MILLILITER(S): 9 INJECTION, SOLUTION INTRAVENOUS at 06:37

## 2022-11-09 RX ADMIN — Medication 15 MILLIGRAM(S): at 11:59

## 2022-11-09 RX ADMIN — Medication 100 GRAM(S): at 08:56

## 2022-11-09 RX ADMIN — Medication 15 MILLIGRAM(S): at 23:53

## 2022-11-09 RX ADMIN — Medication 15 MILLIGRAM(S): at 00:10

## 2022-11-09 RX ADMIN — Medication 15 MILLIGRAM(S): at 19:00

## 2022-11-09 RX ADMIN — Medication 15 MILLIGRAM(S): at 12:20

## 2022-11-09 RX ADMIN — Medication 650 MILLIGRAM(S): at 03:39

## 2022-11-09 NOTE — DIETITIAN INITIAL EVALUATION ADULT - PERTINENT MEDS FT
MEDICATIONS  (STANDING):  influenza   Vaccine 0.5 milliLiter(s) IntraMuscular once  ketorolac   Injectable 15 milliGRAM(s) IV Push every 6 hours  lactated ringers. 1000 milliLiter(s) (80 mL/Hr) IV Continuous <Continuous>  pantoprazole  Injectable 40 milliGRAM(s) IV Push daily    MEDICATIONS  (PRN):  acetaminophen    Suspension .. 650 milliGRAM(s) Oral every 6 hours PRN Mild Pain (1 - 3)  ondansetron Injectable 4 milliGRAM(s) IV Push every 8 hours PRN Nausea and/or Vomiting

## 2022-11-09 NOTE — DISCHARGE NOTE PROVIDER - NSDCMRMEDTOKEN_GEN_ALL_CORE_FT
acetaminophen 160 mg/5 mL oral suspension: 20 milliliter(s) orally every 6 hours, As Needed -Moderate Pain (1 - 3) MDD:4000mg  Eliquis 2.5 mg oral tablet: 1 tab(s) orally 2 times a day MDD:2  PLEASE START ON 11/11/22  omeprazole 40 mg oral delayed release capsule: 1 cap(s) orally once a day MDD:1  Vitamin D3 1250 mcg (50,000 intl units) oral capsule: 1 cap(s) orally once a week  
no

## 2022-11-09 NOTE — DIETITIAN INITIAL EVALUATION ADULT - PERTINENT LABORATORY DATA
11-09    137  |  102  |  10  ----------------------------<  109<H>  4.5   |  25  |  0.99    Ca    9.2      09 Nov 2022 06:21  Phos  3.3     11-09  Mg     1.9     11-09

## 2022-11-09 NOTE — DISCHARGE NOTE PROVIDER - NSDCCPCAREPLAN_GEN_ALL_CORE_FT
PRINCIPAL DISCHARGE DIAGNOSIS  Diagnosis: Morbid obesity  Assessment and Plan of Treatment: s/p modified duodenal switch and hiatal hernia repair       PRINCIPAL DISCHARGE DIAGNOSIS  Diagnosis: Morbid obesity  Assessment and Plan of Treatment: 36 y/o female patient with sciatica, refractory morbid obesity (BMI 51) and vitamin deficiencies; presented on day of admission for bariatric surgery as means to improve her overall health.  Denied any other complains such as reflux, persistent nausea or BOB. Patient underwent modified duodenal switch and hiatal hernia repair and was admitted for further management and monitoring. Her postoperative course was unremarkable with advancement of diet, passing trial of void, and pain control. Pt tolerated the procedure well. At time of discharge, pt was tolerating a bariatric clear liquid diet, and pt's pain was controlled. Plan is to follow up with Dr. Rodriguez in the office.     1) Please take Tylenol 650 mg every 4 to 6 hours by mouth for moderate pain control. Please do not exceed over 4,000 mg of Tylenol a day.  2) Please start taking Eliquis 2.5 mg by mouth twice a day starting 3 days after surgery . Please start  Friday November 11, 2022.  3) Please take Omeprazole 40 mg once a day by mouth.

## 2022-11-09 NOTE — DIETITIAN INITIAL EVALUATION ADULT - OTHER INFO
38 y/o female patient with sciatica, refractory morbid obesity (BMI 51) and vitamin deficiencies; comes in for bariatric surgery as means to improve her overall health. s/p lap MDS with hiatal hernia repair 11/8/22.    On assessment, pt resting in bed. Currently on BARICLLIQ diet, tolerating PO. Consumed 3 oz of fluids this morning in 1 hr. Understands aiming for 4 oz for each hour. Pain and nausea well controlled. Discussed volumes of various cup sizes on tray table and encouraged aiming for 4 oz/hr as tolerated. Prepared with protein shakes, with plan to get vitamins after discharge. RD provided indepth edu on diet advancement and specific nutrient needs s/p MDS. Emphasized importance of following up with outpatient RD services for wt management and diet progression. NKFA. No dietary restrictions at home. Reports was weighing at 341 for first weigh-in in July, current wt at 317 lbs, wt loss of 24 lbs/7% in 3-4 months. Skin: surgical incisions. GI: WDL per flowsheet. RD to follow up.

## 2022-11-09 NOTE — DISCHARGE NOTE PROVIDER - NSDCCPGOAL_GEN_ALL_CORE_FT
To get better and follow your care plan as instructed. To get better and follow your care plan as instructed.  1) Please take Tylenol 650 mg every 4 to 6 hours by mouth for moderate pain control. Please do not exceed over 4,000 mg of Tylenol a day.  2) Please start taking Eliquis 2.5 mg by mouth twice a day starting 3 days after surgery . Please start  Friday November 11, 2022.  3) Please take Omeprazole 40 mg once a day by mouth.

## 2022-11-09 NOTE — DISCHARGE NOTE PROVIDER - HOSPITAL COURSE
36 y/o female patient with sciatica, refractory morbid obesity (BMI 51) and vitamin deficiencies; presented on day of admission for bariatric surgery as means to improve her overall health.  Denied any other complains such as reflux, persistent nausea or BOB. Patient underwent modified duodenal switch and hiatal hernia repair and was admitted for further management and monitoring. Her postoperative course was unremarkable with advancement of diet, passing trial of void, and pain control. On day of discharge patient was stable to be d/c'd home.   38 y/o female patient with sciatica, refractory morbid obesity (BMI 51) and vitamin deficiencies; presented on day of admission for bariatric surgery as means to improve her overall health.  Denied any other complains such as reflux, persistent nausea or BOB. Patient underwent modified duodenal switch and hiatal hernia repair and was admitted for further management and monitoring. Her postoperative course was unremarkable with advancement of diet, passing trial of void, and pain control. Pt tolerated the procedure well. At time of discharge, pt was tolerating a bariatric clear liquid diet, and pt's pain was controlled. Plan is to follow up with Dr. Rodriguez in the office.

## 2022-11-09 NOTE — DIETITIAN INITIAL EVALUATION ADULT - OTHER CALCULATIONS
lbs. %%. IBW used to calculate energy needs due to pt's current body weight exceeding 120% of IBW. Needs adjusted for age and wt, s/p bariatric surgery. Above energy needs calculated for wt maintenance (20-25kcal/kg).   Weeks 1-2 estimated needs: kcal/day (10-12kcal/kg), g pro/day (1.5-2.1g/kg), >/=64oz clear fluids.

## 2022-11-09 NOTE — DISCHARGE NOTE PROVIDER - NSDCCPTREATMENT_GEN_ALL_CORE_FT
PRINCIPAL PROCEDURE  Procedure: Laparoscopic loop duodenal switch  Findings and Treatment:       SECONDARY PROCEDURE  Procedure: Laparoscopic herniorrhaphy of hiatal hernia  Findings and Treatment:

## 2022-11-09 NOTE — DISCHARGE NOTE PROVIDER - CARE PROVIDER_API CALL
Bandar Rodriguez (MD)  Surgery  [address] [place] [address], [place] [address]  Phone: [phone]  Fax: [phone]  Follow Up Time: 1 week

## 2022-11-09 NOTE — DISCHARGE NOTE PROVIDER - NSDCFUADDINST_GEN_ALL_CORE_FT
Follow up with Dr. Rodriguez in 1 week. Call the office at  to schedule your appointment. You may shower; soap and water over incision sites. Do not scrub. Pat dry when done. No tub bathing or swimming until cleared. Keep incision sites out of the sun as scars will darken. No heavy lifting (>10lbs) or strenuous exercise. Diet: Bariatric Full Fluids. 60 grams protein daily.  64 fluid ounces water daily. Drink small sips throughout the day. Continue diet as outlined by paperwork received as a pre-operative patient. You should be urinating at least 3-4x per day. Call the office if you experience increasing abdominal pain, nausea, vomiting, or temperature >100.4F.  NO ASPIRIN OR NSAIDs until approved by Dr. Rodriguez. Avoid alcoholic beverages until cleared by Dr. Rodriguez.  1) Please take Tylenol 650 mg every 4 to 6 hours by mouth for moderate pain control. Please do not exceed over 4,000 mg of Tylenol a day.  2) Please start taking Eliquis 2.5 mg by mouth twice a day starting 3 days after surgery starting on 11/11/22.  3) Please take Omeprazole 40 mg once a day by mouth.

## 2022-11-09 NOTE — DIETITIAN INITIAL EVALUATION ADULT - PERSON TAUGHT/METHOD
Education for bariatric diet progression provided/verbal instruction/written material/patient instructed

## 2022-11-10 ENCOUNTER — TRANSCRIPTION ENCOUNTER (OUTPATIENT)
Age: 37
End: 2022-11-10

## 2022-11-10 VITALS
TEMPERATURE: 98 F | DIASTOLIC BLOOD PRESSURE: 80 MMHG | OXYGEN SATURATION: 99 % | HEART RATE: 68 BPM | SYSTOLIC BLOOD PRESSURE: 119 MMHG | RESPIRATION RATE: 19 BRPM

## 2022-11-10 LAB
ANION GAP SERPL CALC-SCNC: 9 MMOL/L — SIGNIFICANT CHANGE UP (ref 5–17)
BASOPHILS # BLD AUTO: 0.04 K/UL — SIGNIFICANT CHANGE UP (ref 0–0.2)
BASOPHILS NFR BLD AUTO: 0.5 % — SIGNIFICANT CHANGE UP (ref 0–2)
BUN SERPL-MCNC: 10 MG/DL — SIGNIFICANT CHANGE UP (ref 7–23)
CALCIUM SERPL-MCNC: 9 MG/DL — SIGNIFICANT CHANGE UP (ref 8.4–10.5)
CHLORIDE SERPL-SCNC: 101 MMOL/L — SIGNIFICANT CHANGE UP (ref 96–108)
CO2 SERPL-SCNC: 24 MMOL/L — SIGNIFICANT CHANGE UP (ref 22–31)
CREAT SERPL-MCNC: 0.9 MG/DL — SIGNIFICANT CHANGE UP (ref 0.5–1.3)
EGFR: 86 ML/MIN/1.73M2 — SIGNIFICANT CHANGE UP
EOSINOPHIL # BLD AUTO: 0.13 K/UL — SIGNIFICANT CHANGE UP (ref 0–0.5)
EOSINOPHIL NFR BLD AUTO: 1.5 % — SIGNIFICANT CHANGE UP (ref 0–6)
GLUCOSE SERPL-MCNC: 92 MG/DL — SIGNIFICANT CHANGE UP (ref 70–99)
HCT VFR BLD CALC: 36 % — SIGNIFICANT CHANGE UP (ref 34.5–45)
HGB BLD-MCNC: 11.7 G/DL — SIGNIFICANT CHANGE UP (ref 11.5–15.5)
IMM GRANULOCYTES NFR BLD AUTO: 0.3 % — SIGNIFICANT CHANGE UP (ref 0–0.9)
LYMPHOCYTES # BLD AUTO: 2.04 K/UL — SIGNIFICANT CHANGE UP (ref 1–3.3)
LYMPHOCYTES # BLD AUTO: 23.5 % — SIGNIFICANT CHANGE UP (ref 13–44)
MAGNESIUM SERPL-MCNC: 1.9 MG/DL — SIGNIFICANT CHANGE UP (ref 1.6–2.6)
MCHC RBC-ENTMCNC: 28.3 PG — SIGNIFICANT CHANGE UP (ref 27–34)
MCHC RBC-ENTMCNC: 32.5 GM/DL — SIGNIFICANT CHANGE UP (ref 32–36)
MCV RBC AUTO: 87.2 FL — SIGNIFICANT CHANGE UP (ref 80–100)
MONOCYTES # BLD AUTO: 0.71 K/UL — SIGNIFICANT CHANGE UP (ref 0–0.9)
MONOCYTES NFR BLD AUTO: 8.2 % — SIGNIFICANT CHANGE UP (ref 2–14)
NEUTROPHILS # BLD AUTO: 5.74 K/UL — SIGNIFICANT CHANGE UP (ref 1.8–7.4)
NEUTROPHILS NFR BLD AUTO: 66 % — SIGNIFICANT CHANGE UP (ref 43–77)
NRBC # BLD: 0 /100 WBCS — SIGNIFICANT CHANGE UP (ref 0–0)
PHOSPHATE SERPL-MCNC: 2.5 MG/DL — SIGNIFICANT CHANGE UP (ref 2.5–4.5)
PLATELET # BLD AUTO: 331 K/UL — SIGNIFICANT CHANGE UP (ref 150–400)
POTASSIUM SERPL-MCNC: 4.1 MMOL/L — SIGNIFICANT CHANGE UP (ref 3.5–5.3)
POTASSIUM SERPL-SCNC: 4.1 MMOL/L — SIGNIFICANT CHANGE UP (ref 3.5–5.3)
RBC # BLD: 4.13 M/UL — SIGNIFICANT CHANGE UP (ref 3.8–5.2)
RBC # FLD: 13.1 % — SIGNIFICANT CHANGE UP (ref 10.3–14.5)
SODIUM SERPL-SCNC: 134 MMOL/L — LOW (ref 135–145)
WBC # BLD: 8.69 K/UL — SIGNIFICANT CHANGE UP (ref 3.8–10.5)
WBC # FLD AUTO: 8.69 K/UL — SIGNIFICANT CHANGE UP (ref 3.8–10.5)

## 2022-11-10 PROCEDURE — 80048 BASIC METABOLIC PNL TOTAL CA: CPT

## 2022-11-10 PROCEDURE — 88307 TISSUE EXAM BY PATHOLOGIST: CPT

## 2022-11-10 PROCEDURE — 86900 BLOOD TYPING SEROLOGIC ABO: CPT

## 2022-11-10 PROCEDURE — 85025 COMPLETE CBC W/AUTO DIFF WBC: CPT

## 2022-11-10 PROCEDURE — C1889: CPT

## 2022-11-10 PROCEDURE — 86901 BLOOD TYPING SEROLOGIC RH(D): CPT

## 2022-11-10 PROCEDURE — 36415 COLL VENOUS BLD VENIPUNCTURE: CPT

## 2022-11-10 PROCEDURE — 83735 ASSAY OF MAGNESIUM: CPT

## 2022-11-10 PROCEDURE — 85027 COMPLETE CBC AUTOMATED: CPT

## 2022-11-10 PROCEDURE — 84100 ASSAY OF PHOSPHORUS: CPT

## 2022-11-10 PROCEDURE — 86850 RBC ANTIBODY SCREEN: CPT

## 2022-11-10 RX ADMIN — Medication 15 MILLIGRAM(S): at 06:13

## 2022-11-10 RX ADMIN — Medication 15 MILLIGRAM(S): at 00:08

## 2022-11-10 RX ADMIN — Medication 15 MILLIGRAM(S): at 06:30

## 2022-11-10 RX ADMIN — SCOPALAMINE 1 PATCH: 1 PATCH, EXTENDED RELEASE TRANSDERMAL at 05:55

## 2022-11-10 NOTE — PROGRESS NOTE ADULT - SUBJECTIVE AND OBJECTIVE BOX
INTERVAL HPI/OVERNIGHT EVENTS: ilsa diet, -N/-V, +OOBA  11/9: 1/2 IVF. 4 oz only today , c/o nausea denies emesis , pain cotnrolled       STATUS POST:  Laparoscopic MDS and hiatal hernia repair     POST OPERATIVE DAY #: 2    SUBJECTIVE:  Patient examined bedside with chief resident. Patients complain of incisional pain. Patient reports that she is tolerating bariatric clear liquid diet 4oz/hr . Patient reports she is ambulating and using incentive spirometer. Patient denies SOB, chest pain, nausea and emesis. Patient making adequate urine output.            Vital Signs Last 24 Hrs  T(C): 37.1 (10 Nov 2022 05:00), Max: 37.6 (09 Nov 2022 16:25)  T(F): 98.8 (10 Nov 2022 05:00), Max: 99.6 (09 Nov 2022 16:25)  HR: 76 (10 Nov 2022 05:00) (72 - 93)  BP: 138/87 (10 Nov 2022 05:00) (118/64 - 138/87)  BP(mean): 104 (10 Nov 2022 05:00) (101 - 104)  RR: 17 (10 Nov 2022 05:00) (17 - 18)  SpO2: 97% (10 Nov 2022 05:00) (95% - 99%)    Parameters below as of 10 Nov 2022 05:00  Patient On (Oxygen Delivery Method): room air      I&O's Detail    09 Nov 2022 07:01  -  10 Nov 2022 07:00  --------------------------------------------------------  IN:    IV PiggyBack: 100 mL    Lactated Ringers: 1760 mL    Oral Fluid: 480 mL  Total IN: 2340 mL    OUT:    Voided (mL): 1450 mL  Total OUT: 1450 mL    Total NET: 890 mL          General: NAD, resting comfortably in bed  C/V: NSR  Pulm: Nonlabored breathing, no respiratory distress  Abd: Soft, non-distended, TTP around incision site, incision clean dry and intact.   Extrem: WWP, no edema, SCDs in place        LABS:                        11.7   8.69  )-----------( 331      ( 10 Nov 2022 05:30 )             36.0     11-10    134<L>  |  101  |  10  ----------------------------<  92  4.1   |  24  |  0.90    Ca    9.0      10 Nov 2022 05:30  Phos  2.5     11-10  Mg     1.9     11-10    
INTERVAL HPI/OVERNIGHT EVENTS: c/o of epigastric gas pains, encouraged ambulation and IS use     STATUS POST:  MDS HHR    POST OPERATIVE DAY #: 1    SUBJECTIVE:  pt seen at bedside, complains of gas pain. ambulating, nausea improved. tolerating a small amount of liquids    MEDICATIONS  (STANDING):  influenza   Vaccine 0.5 milliLiter(s) IntraMuscular once  ketorolac   Injectable 15 milliGRAM(s) IV Push every 6 hours  lactated ringers. 1000 milliLiter(s) (80 mL/Hr) IV Continuous <Continuous>  pantoprazole  Injectable 40 milliGRAM(s) IV Push daily    MEDICATIONS  (PRN):  acetaminophen    Suspension .. 650 milliGRAM(s) Oral every 6 hours PRN Mild Pain (1 - 3)  ondansetron Injectable 4 milliGRAM(s) IV Push every 8 hours PRN Nausea and/or Vomiting      Vital Signs Last 24 Hrs  T(C): 37.1 (09 Nov 2022 05:28), Max: 37.2 (09 Nov 2022 00:06)  T(F): 98.8 (09 Nov 2022 05:28), Max: 98.9 (09 Nov 2022 00:06)  HR: 76 (09 Nov 2022 05:28) (76 - 102)  BP: 136/82 (09 Nov 2022 05:28) (120/77 - 147/85)  BP(mean): 104 (08 Nov 2022 13:53) (84 - 110)  RR: 17 (09 Nov 2022 05:28) (16 - 22)  SpO2: 100% (09 Nov 2022 05:28) (95% - 100%)    Parameters below as of 09 Nov 2022 05:28  Patient On (Oxygen Delivery Method): room air        PHYSICAL EXAM:      Constitutional: A&Ox3    Respiratory: non labored breathing, no respiratory distress    Cardiovascular: NSR, RRR    Gastrointestinal: soft, nondistended, appropriate incisional tenderness, incisions c/d/i    Extremities: (-) edema                  I&O's Detail    08 Nov 2022 07:01  -  09 Nov 2022 07:00  --------------------------------------------------------  IN:    IV PiggyBack: 150 mL    Lactated Ringers: 2970 mL    Oral Fluid: 275 mL  Total IN: 3395 mL    OUT:    Voided (mL): 1250 mL  Total OUT: 1250 mL    Total NET: 2145 mL          LABS:                        11.9   14.69 )-----------( 345      ( 09 Nov 2022 06:21 )             36.4     11-09    137  |  102  |  10  ----------------------------<  109<H>  4.5   |  25  |  0.99    Ca    9.2      09 Nov 2022 06:21  Phos  3.3     11-09  Mg     1.9     11-09            RADIOLOGY & ADDITIONAL STUDIES:

## 2022-11-10 NOTE — PROGRESS NOTE ADULT - ASSESSMENT
37 F sciatica, refractory morbid obesity (BMI 51) and vitamin deficiencies; s/p lap MDS with hiatal hernia repair 11/8/22.    -BCLD/IVFs  -Pain/nausea mx  -OOBA/IS  -Nutrition AM  -Routine labs  -encourage PO intake  -encourage ambulation  -possible dc today if tolerating 4oz/hr  
37 F sciatica, refractory morbid obesity (BMI 51) and vitamin deficiencies; s/p lap MDS with hiatal hernia repair 11/8/22.    -BCLD/IVFs  -Pain/nausea mx  -OOBA/IS  -Discharge home today

## 2022-11-10 NOTE — PROGRESS NOTE ADULT - REASON FOR ADMISSION
36 y/o female patient with sciatica, refractory morbid obesity (BMI 51) and vitamin deficiencies; comes in for bariatric surgery as means to improve her overall health.
36 y/o female patient with sciatica, refractory morbid obesity (BMI 51) and vitamin deficiencies; comes in for bariatric surgery as means to improve her overall health.

## 2022-11-10 NOTE — DISCHARGE NOTE NURSING/CASE MANAGEMENT/SOCIAL WORK - PATIENT PORTAL LINK FT
You can access the FollowMyHealth Patient Portal offered by [facility] by registering at the following website: [email or web address]. By joining [employer]’s FollowMyHealth portal, you will also be able to view your health information using other applications (apps) compatible with our system.

## 2022-11-11 ENCOUNTER — NON-APPOINTMENT (OUTPATIENT)
Age: 37
End: 2022-11-11

## 2022-11-11 PROBLEM — M54.9 DORSALGIA, UNSPECIFIED: Chronic | Status: ACTIVE | Noted: 2022-11-07

## 2022-11-11 PROBLEM — M25.562 PAIN IN LEFT KNEE: Chronic | Status: ACTIVE | Noted: 2022-11-07

## 2022-11-11 PROBLEM — G43.909 MIGRAINE, UNSPECIFIED, NOT INTRACTABLE, WITHOUT STATUS MIGRAINOSUS: Chronic | Status: ACTIVE | Noted: 2022-11-07

## 2022-11-11 PROBLEM — E66.01 MORBID (SEVERE) OBESITY DUE TO EXCESS CALORIES: Chronic | Status: ACTIVE | Noted: 2022-11-07

## 2022-11-14 DIAGNOSIS — G47.33 OBSTRUCTIVE SLEEP APNEA (ADULT) (PEDIATRIC): ICD-10-CM

## 2022-11-14 DIAGNOSIS — K44.9 DIAPHRAGMATIC HERNIA WITHOUT OBSTRUCTION OR GANGRENE: ICD-10-CM

## 2022-11-14 DIAGNOSIS — E83.42 HYPOMAGNESEMIA: ICD-10-CM

## 2022-11-14 DIAGNOSIS — E66.01 MORBID (SEVERE) OBESITY DUE TO EXCESS CALORIES: ICD-10-CM

## 2022-11-17 ENCOUNTER — APPOINTMENT (OUTPATIENT)
Dept: BARIATRICS | Facility: CLINIC | Age: 37
End: 2022-11-17

## 2022-11-17 VITALS
HEIGHT: 65.5 IN | HEART RATE: 109 BPM | WEIGHT: 293 LBS | DIASTOLIC BLOOD PRESSURE: 84 MMHG | TEMPERATURE: 97.3 F | SYSTOLIC BLOOD PRESSURE: 127 MMHG | BODY MASS INDEX: 48.23 KG/M2 | OXYGEN SATURATION: 97 %

## 2022-11-17 PROCEDURE — 99024 POSTOP FOLLOW-UP VISIT: CPT

## 2022-11-29 LAB — SURGICAL PATHOLOGY STUDY: SIGNIFICANT CHANGE UP

## 2022-12-12 ENCOUNTER — EMERGENCY (EMERGENCY)
Facility: HOSPITAL | Age: 37
LOS: 1 days | Discharge: HOME | End: 2022-12-12
Attending: STUDENT IN AN ORGANIZED HEALTH CARE EDUCATION/TRAINING PROGRAM | Admitting: STUDENT IN AN ORGANIZED HEALTH CARE EDUCATION/TRAINING PROGRAM
Payer: COMMERCIAL

## 2022-12-12 VITALS
RESPIRATION RATE: 18 BRPM | OXYGEN SATURATION: 98 % | HEART RATE: 89 BPM | HEIGHT: 66 IN | DIASTOLIC BLOOD PRESSURE: 83 MMHG | SYSTOLIC BLOOD PRESSURE: 141 MMHG | WEIGHT: 279.99 LBS | TEMPERATURE: 98 F

## 2022-12-12 VITALS
TEMPERATURE: 98 F | DIASTOLIC BLOOD PRESSURE: 87 MMHG | OXYGEN SATURATION: 100 % | RESPIRATION RATE: 18 BRPM | HEART RATE: 72 BPM | SYSTOLIC BLOOD PRESSURE: 130 MMHG

## 2022-12-12 DIAGNOSIS — R42 DIZZINESS AND GIDDINESS: ICD-10-CM

## 2022-12-12 DIAGNOSIS — Z87.19 PERSONAL HISTORY OF OTHER DISEASES OF THE DIGESTIVE SYSTEM: ICD-10-CM

## 2022-12-12 DIAGNOSIS — Z98.84 BARIATRIC SURGERY STATUS: ICD-10-CM

## 2022-12-12 DIAGNOSIS — Z87.39 PERSONAL HISTORY OF OTHER DISEASES OF THE MUSCULOSKELETAL SYSTEM AND CONNECTIVE TISSUE: ICD-10-CM

## 2022-12-12 DIAGNOSIS — Z79.01 LONG TERM (CURRENT) USE OF ANTICOAGULANTS: ICD-10-CM

## 2022-12-12 DIAGNOSIS — R82.4 ACETONURIA: ICD-10-CM

## 2022-12-12 DIAGNOSIS — H93.8X3 OTHER SPECIFIED DISORDERS OF EAR, BILATERAL: ICD-10-CM

## 2022-12-12 DIAGNOSIS — G43.909 MIGRAINE, UNSPECIFIED, NOT INTRACTABLE, WITHOUT STATUS MIGRAINOSUS: ICD-10-CM

## 2022-12-12 DIAGNOSIS — Z20.822 CONTACT WITH AND (SUSPECTED) EXPOSURE TO COVID-19: ICD-10-CM

## 2022-12-12 LAB
ALBUMIN SERPL ELPH-MCNC: 4.2 G/DL — SIGNIFICANT CHANGE UP (ref 3.3–5)
ALP SERPL-CCNC: 90 U/L — SIGNIFICANT CHANGE UP (ref 40–120)
ALT FLD-CCNC: 29 U/L — SIGNIFICANT CHANGE UP (ref 10–45)
ANION GAP SERPL CALC-SCNC: 13 MMOL/L — SIGNIFICANT CHANGE UP (ref 5–17)
APPEARANCE UR: CLEAR — SIGNIFICANT CHANGE UP
APTT BLD: 37.5 SEC — HIGH (ref 27.5–35.5)
AST SERPL-CCNC: 23 U/L — SIGNIFICANT CHANGE UP (ref 10–40)
BACTERIA # UR AUTO: PRESENT /HPF
BASOPHILS # BLD AUTO: 0.03 K/UL — SIGNIFICANT CHANGE UP (ref 0–0.2)
BASOPHILS NFR BLD AUTO: 0.4 % — SIGNIFICANT CHANGE UP (ref 0–2)
BILIRUB SERPL-MCNC: 0.7 MG/DL — SIGNIFICANT CHANGE UP (ref 0.2–1.2)
BILIRUB UR-MCNC: ABNORMAL
BUN SERPL-MCNC: 7 MG/DL — SIGNIFICANT CHANGE UP (ref 7–23)
CALCIUM SERPL-MCNC: 9.5 MG/DL — SIGNIFICANT CHANGE UP (ref 8.4–10.5)
CHLORIDE SERPL-SCNC: 103 MMOL/L — SIGNIFICANT CHANGE UP (ref 96–108)
CO2 SERPL-SCNC: 25 MMOL/L — SIGNIFICANT CHANGE UP (ref 22–31)
COD CRY URNS QL: ABNORMAL /HPF
COLOR SPEC: YELLOW — SIGNIFICANT CHANGE UP
COMMENT - URINE: SIGNIFICANT CHANGE UP
CREAT SERPL-MCNC: 0.78 MG/DL — SIGNIFICANT CHANGE UP (ref 0.5–1.3)
DIFF PNL FLD: NEGATIVE — SIGNIFICANT CHANGE UP
EGFR: 102 ML/MIN/1.73M2 — SIGNIFICANT CHANGE UP
EOSINOPHIL # BLD AUTO: 0.29 K/UL — SIGNIFICANT CHANGE UP (ref 0–0.5)
EOSINOPHIL NFR BLD AUTO: 4.2 % — SIGNIFICANT CHANGE UP (ref 0–6)
EPI CELLS # UR: ABNORMAL /HPF (ref 0–5)
GLUCOSE SERPL-MCNC: 96 MG/DL — SIGNIFICANT CHANGE UP (ref 70–99)
GLUCOSE UR QL: NEGATIVE — SIGNIFICANT CHANGE UP
HCG SERPL-ACNC: <0 MIU/ML — SIGNIFICANT CHANGE UP
HCT VFR BLD CALC: 36.3 % — SIGNIFICANT CHANGE UP (ref 34.5–45)
HGB BLD-MCNC: 11.8 G/DL — SIGNIFICANT CHANGE UP (ref 11.5–15.5)
HYALINE CASTS # UR AUTO: SIGNIFICANT CHANGE UP /LPF (ref 0–2)
IMM GRANULOCYTES NFR BLD AUTO: 0.1 % — SIGNIFICANT CHANGE UP (ref 0–0.9)
INR BLD: 1.27 — HIGH (ref 0.88–1.16)
KETONES UR-MCNC: 40 MG/DL
LEUKOCYTE ESTERASE UR-ACNC: NEGATIVE — SIGNIFICANT CHANGE UP
LIDOCAIN IGE QN: 59 U/L — SIGNIFICANT CHANGE UP (ref 7–60)
LYMPHOCYTES # BLD AUTO: 2.59 K/UL — SIGNIFICANT CHANGE UP (ref 1–3.3)
LYMPHOCYTES # BLD AUTO: 37.4 % — SIGNIFICANT CHANGE UP (ref 13–44)
MAGNESIUM SERPL-MCNC: 1.9 MG/DL — SIGNIFICANT CHANGE UP (ref 1.6–2.6)
MCHC RBC-ENTMCNC: 28.1 PG — SIGNIFICANT CHANGE UP (ref 27–34)
MCHC RBC-ENTMCNC: 32.5 GM/DL — SIGNIFICANT CHANGE UP (ref 32–36)
MCV RBC AUTO: 86.4 FL — SIGNIFICANT CHANGE UP (ref 80–100)
MONOCYTES # BLD AUTO: 0.58 K/UL — SIGNIFICANT CHANGE UP (ref 0–0.9)
MONOCYTES NFR BLD AUTO: 8.4 % — SIGNIFICANT CHANGE UP (ref 2–14)
NEUTROPHILS # BLD AUTO: 3.43 K/UL — SIGNIFICANT CHANGE UP (ref 1.8–7.4)
NEUTROPHILS NFR BLD AUTO: 49.5 % — SIGNIFICANT CHANGE UP (ref 43–77)
NITRITE UR-MCNC: NEGATIVE — SIGNIFICANT CHANGE UP
NRBC # BLD: 0 /100 WBCS — SIGNIFICANT CHANGE UP (ref 0–0)
PH UR: 6 — SIGNIFICANT CHANGE UP (ref 5–8)
PHOSPHATE SERPL-MCNC: 2.2 MG/DL — LOW (ref 2.5–4.5)
PLATELET # BLD AUTO: 277 K/UL — SIGNIFICANT CHANGE UP (ref 150–400)
POTASSIUM SERPL-MCNC: 3.5 MMOL/L — SIGNIFICANT CHANGE UP (ref 3.5–5.3)
POTASSIUM SERPL-SCNC: 3.5 MMOL/L — SIGNIFICANT CHANGE UP (ref 3.5–5.3)
PROT SERPL-MCNC: 7 G/DL — SIGNIFICANT CHANGE UP (ref 6–8.3)
PROT UR-MCNC: ABNORMAL MG/DL
PROTHROM AB SERPL-ACNC: 15.2 SEC — HIGH (ref 10.5–13.4)
RAPID RVP RESULT: SIGNIFICANT CHANGE UP
RBC # BLD: 4.2 M/UL — SIGNIFICANT CHANGE UP (ref 3.8–5.2)
RBC # FLD: 14.5 % — SIGNIFICANT CHANGE UP (ref 10.3–14.5)
RBC CASTS # UR COMP ASSIST: < 5 /HPF — SIGNIFICANT CHANGE UP
SARS-COV-2 RNA SPEC QL NAA+PROBE: SIGNIFICANT CHANGE UP
SODIUM SERPL-SCNC: 141 MMOL/L — SIGNIFICANT CHANGE UP (ref 135–145)
SP GR SPEC: 1.02 — SIGNIFICANT CHANGE UP (ref 1–1.03)
UROBILINOGEN FLD QL: 1 E.U./DL — SIGNIFICANT CHANGE UP
WBC # BLD: 6.93 K/UL — SIGNIFICANT CHANGE UP (ref 3.8–10.5)
WBC # FLD AUTO: 6.93 K/UL — SIGNIFICANT CHANGE UP (ref 3.8–10.5)
WBC UR QL: < 5 /HPF — SIGNIFICANT CHANGE UP

## 2022-12-12 PROCEDURE — 81001 URINALYSIS AUTO W/SCOPE: CPT

## 2022-12-12 PROCEDURE — 83690 ASSAY OF LIPASE: CPT

## 2022-12-12 PROCEDURE — 93010 ELECTROCARDIOGRAM REPORT: CPT

## 2022-12-12 PROCEDURE — 71046 X-RAY EXAM CHEST 2 VIEWS: CPT

## 2022-12-12 PROCEDURE — 99285 EMERGENCY DEPT VISIT HI MDM: CPT | Mod: 25

## 2022-12-12 PROCEDURE — 36415 COLL VENOUS BLD VENIPUNCTURE: CPT

## 2022-12-12 PROCEDURE — 85025 COMPLETE CBC W/AUTO DIFF WBC: CPT

## 2022-12-12 PROCEDURE — 99285 EMERGENCY DEPT VISIT HI MDM: CPT

## 2022-12-12 PROCEDURE — 0225U NFCT DS DNA&RNA 21 SARSCOV2: CPT

## 2022-12-12 PROCEDURE — 82962 GLUCOSE BLOOD TEST: CPT

## 2022-12-12 PROCEDURE — 71046 X-RAY EXAM CHEST 2 VIEWS: CPT | Mod: 26

## 2022-12-12 PROCEDURE — 83735 ASSAY OF MAGNESIUM: CPT

## 2022-12-12 PROCEDURE — 80053 COMPREHEN METABOLIC PANEL: CPT

## 2022-12-12 PROCEDURE — 85610 PROTHROMBIN TIME: CPT

## 2022-12-12 PROCEDURE — 85730 THROMBOPLASTIN TIME PARTIAL: CPT

## 2022-12-12 PROCEDURE — 93005 ELECTROCARDIOGRAM TRACING: CPT

## 2022-12-12 PROCEDURE — 84702 CHORIONIC GONADOTROPIN TEST: CPT

## 2022-12-12 PROCEDURE — 84100 ASSAY OF PHOSPHORUS: CPT

## 2022-12-12 RX ORDER — SODIUM,POTASSIUM PHOSPHATES 278-250MG
1 POWDER IN PACKET (EA) ORAL ONCE
Refills: 0 | Status: COMPLETED | OUTPATIENT
Start: 2022-12-12 | End: 2022-12-12

## 2022-12-12 RX ORDER — SODIUM CHLORIDE 9 MG/ML
1000 INJECTION INTRAMUSCULAR; INTRAVENOUS; SUBCUTANEOUS ONCE
Refills: 0 | Status: COMPLETED | OUTPATIENT
Start: 2022-12-12 | End: 2022-12-12

## 2022-12-12 RX ADMIN — SODIUM CHLORIDE 1000 MILLILITER(S): 9 INJECTION INTRAMUSCULAR; INTRAVENOUS; SUBCUTANEOUS at 21:18

## 2022-12-12 RX ADMIN — Medication 1 PACKET(S): at 23:37

## 2022-12-12 NOTE — ED PROVIDER NOTE - PATIENT PORTAL LINK FT
You can access the FollowMyHealth Patient Portal offered by [facility] by registering at the following website: [email or web address]. By joining [employer]’s FollowMyHealth portal, you will also be able to view your health information using other applications (apps) compatible with our system.

## 2022-12-12 NOTE — ED PROVIDER NOTE - NOSE [-], MLM
PRIMARY CARE PHYSICIAN  Autumn Gupta MD    Chief Complaint   Patient presents with   • Arm Pain     bilateral, left greater than right   • Back Pain   • Hand Pain     right and left     INITIAL VISIT    HISTORY OF PRESENT ILLNESS:          Symptoms and course of development:  Halle King is a 45 year old female who presents to clinic for evaluation and treatment of chronic elbow pain, chronic right sided neck and upper posterior back pain.  She also has chronic left plantar foot pain. She also has pain affecting her right hand.  Sometimes her hands as well as her feet get swollen.  Pain is characterized as shooting, stabbing, sharp, burning, numb and miserable.  It is worse in the morning.  Massage, ice, chiropractic treatments and trigger point injections by Dr. Kathleen help make pain better.  Carrying, pushing and pulling make her pain worse.    Pain in her elbows started several  years ago starting with her right lateral elbow region.  She got work restrictions then her right arm got better.  Right lateral epicondylar injections have also been helpful in lessening pain. She still has, however, pain on the medial right elbow region. Her left elbow pain then started both medially and laterally.  History is significant for having left lateral epicondyle release and transposition of ulnar nerve by Dr. Arnold 4 years ago.   She believes her elbow pain is related to the type of work that she does.  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  She reports she is currently not working with restrictions and is doing less heavy lifting, pushing and pulling of items that are 15-30 lb in weight.      She goes on to report that her right upper back pain started at  previous employer as a result of repetitive movement.  Her left plantar foot pain started about 1 year ago.  She had an injection that was very painful and did not resolve the pain.    (Please refer to  complete Patient Comfort Assessment Guide - scanned under Media tab).    I have reviewed the patient's medications and allergies, past medical, surgical, social and family history, updating these as appropriate.  See Histories section of the EMR for a display of this information.    BEHAVIORAL HEALTH COMORBIDITIES  Positive for Depression (previously)  Medications used to treat condition(s): None currently  Treating Provider(s): None currently  Is Treatment Satisfactory: No treatment needed at this time.  Most recent PHQ2 score: 0 on 12/10/2018    PAIN LEVEL and Functional Assessment:  Pain Rating Range:  5-8  Pain Level at Present:  5  Oswestry Score:  28     TREATMENTS PREVIOUSLY TRIED:  Occupational Therapy (just restarted last week)  Chiropractic treatments (helpful)  Injections to right lateral and left medial epicondyles (helpful to some extent)  Left lateral debridement/tendon repair performed by Dr. Arnold 2015 (due to nerve damage at left elbow from job at [employer] per patient)  EMG guided trigger point injections (multiple) with Dr. Kathleen (helpful)  Meloxicam (ineffective)  Cyclobenzaprine  Tylenol #3 (last Rx in 9/2019)  Icy Hot (somewhat helpful)  Massage from     CURRENT PAIN MEDICATIONS:  Meloxicam  Cyclobenzaprine    ADVERSE SIDE EFFECTS FROM PAIN MEDICATIONS:   None    CURRENT MEDD:  0    CONTROLLED SUBSTANCE RISK ASSESSMENT  SOAPP-R=12  Overall opioid risk is deemed to be Low  High-risk psychiatric conditions:  No  The PDMP drug monitoring program was reviewed.  Any aberrant behavior on the PDMP?:  No  Most recent UDS:  N/A    Patient currently using oral birth control pill for contraception.    PERTINENT IMAGING AND DIAGNOSTIC STUDIES  X-ray left foot 09/08/2018:  FINDINGS:  No fracture or subluxation. Normal articulations. A small hypertrophic spur projects from the plantar calcaneus.  IMPRESSION:  No acute finding.    MRI cervical spine 10/11/2012:  IMPRESSION: Minor disc bulging as  described at C4-C5 and C5-C6. Mild loss of normal lordotic curvature of the mid cervical spine.    MRI lumbar spine 07/10/2012:  IMPRESSION: Diffuse broad-based disc bulging at L4-L5 and L5-S1 without evidence of significant central canal or neuroforaminal stenosis.    CURRENT MEDICATIONS  Current Outpatient Medications   Medication Sig Dispense Refill   • JUNEL FE 1/20 1-20 MG-MCG per tablet TAKE 1 TABLET BY MOUTH DAILY 84 tablet 5   • valACYclovir (VALTREX) 500 MG tablet Take 1 tablet by mouth daily. 90 tablet 3   • SF 5000 PLUS 1.1 % dental cream Apply 1 application topically 2 times daily.  3   • pregabalin (LYRICA) 25 MG capsule Start taking one capsule once per day at night. After one week start taking one capsule twice per day. 60 capsule 1   • diclofenac-gabapentin-lidocaine 3-6-5 % cream Apply 1-2 grams (pumps) to affected parts of hands and elbows 3-4 times daily. 240 g 11   • ibuprofen (MOTRIN) 200 MG tablet Take 200 mg by mouth every 6 hours as needed for Pain.       No current facility-administered medications for this visit.         ALLERGIES:  Not on File    REVIEW OF SYSTEMS:  General:  Positive for fatigue. Denies fever, sweats or chills.    Psychological:  Positive for Insomnia.  Denies depression or anxiety.  Denies posttraumatic stress disorder, bipolar illness.  HEENT:  Positive for headaches and dizziness.  Negative for loss of hearing, visual disturbance, bloody nose, sinusitis, sores in mouth, hoarseness, trouble swallowing.   Cardiovascular/Pulmonary:  Denies having cough, chest pain or shortness of breath.  Experiences swelling in the feet and hands occasionally..  Gastrointestinal:  Negative for abdominal pain, heartburn, nausea, vomiting, diarrhea or having blood in the stool.  Positive for constipation.  Genitourinary:  Negative for blood in urine, hernia, jaundice, pain with urinating, frequency urinating, urinary incontinence or difficulty starting a stream of urine.  Positive for  urination at night  .   Hematological:  Denies bloody nose.  Neurological:  Negative for seizures, fainting, tremors, weakness or paralysis.  Positive for having numbness and tingling of her hands.  Skin:  Negative for concerning rash or moles.  Positive for itching..   Musculoskeletal:  Positive for back pain and arthritis.    PHYSICAL EXAM  Visit Vitals  BP (!) 127/92 (BP Location: RUE - Right upper extremity, Patient Position: Sitting, Cuff Size: Large Adult)   Pulse 93   Resp 20   Ht 4' 10\" (1.473 m)   Wt 71 kg   LMP 04/29/2019   SpO2 95%   BMI 32.71 kg/m²     General:  Alert, appropriate.  Does not appear somnolent nor intoxicated.  In no apparent distress.  HEENT:  Pupils are not inappropriately constricted or dilated.  Conjunctivae pink.  Sclerae anicteric.  Neck:  Supple.    Respiratory:  Normal respiratory unlabored.  Lungs clear to auscultation bilaterally.  Cardiovascular:  Regular rate and rhythm.  No murmurs, rubs, or gallops.  Peripheral Vascular:  No peripheral edema.  Integumentary:  Warm.  Dry.  Pink.  No rashes or lesions.  No wounds.  Musculoskeletal:  Mild tenderness medial right elbow and lateral left elbow. There is also palpable pain left medial elbow that is more severe than the lateral aspect.  Tender palpable trigger points within the upper right posterior trunk including trapezius, levator scapulae, rhomboids.  Neurologic:  Coordination appropriate for age.  Sensory motor branches intact to all extremities.  Psychiatric:  Does not appear depressed or agitated.    ASSESSMENT  1. Myofascial pain    2. Medial epicondylitis of left elbow      The patient's pain is largely myofascial affecting different parts of her body including her posterior right upper to mid back, her elbows bilaterally and her left foot.    PLAN  Orders Placed This Encounter   • pregabalin (LYRICA) 25 MG capsule   • diclofenac-gabapentin-lidocaine 3-6-5 % cream   1. Discussed medication options for treating her pain  condition. Explained that no one medication can take her pain away. A combination of physical/occupational therapy, physical modifications at her job, good health practices of diet and sleep hygiene, modalities like trigger point injections and massage, and medications can be utilized to manage her chronic pain condition.   2. She wishes to discuss referral to Rheumatology with Dr. Gupta due to the swelling that accompanies pain in her hands.   3. Will start medications including pregabalin and topical lidocaine-diclofenac-gabapentin compound gel cream to apply to the elbows. Explained that compound cream is ordered through the [facility] pharmacy in [place], who will then arrange delivery. Instructions for starting a low dose of pregabalin and titrating dose upward printed with AVS.  4. Other medications that can be considered depending on progress include low dose Naltrexone or Duloxetine.   5. Continue trigger point injections with Dr. Kathleen.   6. Continue Chiropractic treatments with Dr. Craig.  7. Follow closely in one month.     The supervising physician for services performed today is Dr. Aleks Carlos.       no nasal congestion

## 2022-12-12 NOTE — ED PROVIDER NOTE - NSFOLLOWUPINSTRUCTIONS_ED_ALL_ED_FT
Please reach out to Berta Cheng ([facility] ED clinical referral coordinator) to assist you with your follow-up appointment with an ear-nose-throat doctor.     Monday - Friday 11am-7pm  [phone]  [email or web address]     1. You were seen for lightheadednesss. A copy of any of your resulted labs, imaging, and findings have been provided to you. Make sure to view any test results that may not have yet resulted at the time of your discharge by creating a FollowMyHealth account at: [email or web address] to sign up for FollowMyHealth. Please review all of your lab, imaging, and all other results in their entirety with your primary care doctor.   2. Continue to take your home medications as prescribed.   3. Follow up with your surgeon and primary care doctor within 48 hours to assess the symptoms you were seen for in the emergency department and to review all results from your visit. If you don't have a doctor, call [phone] to make an appointment.  4. Return immediately to the emergency department for new, persistent, or worsening symptoms or signs. Return immediately to the emergency department if you have chest pain, shortness of breath, loss of consciousness, vomiting, diarrhea, fainting, or abdominal pain, or fever.   5. For your for health, you should make healthy food choices and be physically active. Also, you should not smoke or use drugs, and you should not drink alcohol in excess. Please visit [email or web address] for resources and more information.

## 2022-12-12 NOTE — ED PROVIDER NOTE - CPE EDP RESP NORM
Visit Information Date & Time Provider Department Dept. Phone Encounter #  
 8/23/2017  6:45 PM Ramona Spangler MD [facility] [phone] [id number] Follow-up Instructions Return if symptoms worsen or fail to improve. Upcoming Health Maintenance Date Due Hepatitis B Peds Age 0-18 (1 of 3 - Primary Series) 2002 IPV Peds Age 0-24 (1 of 4 - All-IPV Series) 2002 Hepatitis A Peds Age 1-18 (1 of 2 - Standard Series) 9/17/2003 MMR Peds Age 1-18 (1 of 2) 9/17/2003 DTaP/Tdap/Td series (2 - Td) 9/15/2013 Varicella Peds Age 1-18 (1 of 2 - 2 Dose Adolescent Series) 9/17/2015 HPV AGE 9Y-34Y (2 of 2 - Female 2 Dose Series) 2/25/2016 INFLUENZA AGE 9 TO ADULT 8/1/2017 MCV through Age 25 (2 of 2) 9/17/2018 Allergies as of 8/23/2017  Review Complete On: 8/23/2017 By: Mariluz Patel LPN Severity Noted Reaction Type Reactions Peanut Butter Flavor [Flavoring Agent]  08/18/2013    Hives Current Immunizations  Reviewed on 8/18/2013 Name Date HPV (9-valent) 8/25/2015 10:31 AM  
 Meningococcal (MCV4P) Vaccine 8/25/2015 10:30 AM  
 Tdap 8/18/2013 Not reviewed this visit You Were Diagnosed With   
  
 Codes Comments Fever, unspecified fever cause    -  Primary ICD-10-CM: R50.9 ICD-9-CM: 780.60 Vitals BP Pulse Temp Resp Height(growth percentile) Weight(growth percentile) 121/82 (76 %/ 91 %)* 98 99.1 °F (37.3 °C) (Oral) 18 5' 7.5\" (1.715 m) (93 %, Z= 1.48) 190 lb 9.6 oz (86.5 kg) (98 %, Z= 2.06) LMP SpO2 BMI OB Status Smoking Status 07/21/2017 (Approximate) 98% 29.41 kg/m2 (96 %, Z= 1.80) Having regular periods Never Smoker *BP percentiles are based on NHBPEP's 4th Report Growth percentiles are based on CDC 2-20 Years data. BMI and BSA Data Body Mass Index Body Surface Area  
 29.41 kg/m 2 2.03 m 2 Preferred Pharmacy Pharmacy Name Phone [id number] [phone] [phone] Your Updated Medication List  
  
   
This list is accurate as of: 8/23/17  7:54 PM.  Always use your most recent med list.  
  
  
  
  
 azithromycin 250 mg tablet Commonly known as:  Chi Marshal Take 2 tablets today, then take 1 tablet daily  
  
 predniSONE 20 mg tablet Commonly known as:  Mayte Mendez Take 1 Tab by mouth daily (with breakfast). ZYRTEC PO Take  by mouth. Prescriptions Sent to Pharmacy Refills  
 azithromycin (ZITHROMAX) 250 mg tablet 0 Sig: Take 2 tablets today, then take 1 tablet daily Class: Normal  
 [id number] [address] Ph #: [phone]  
 predniSONE (DELTASONE) 20 mg tablet 0 Sig: Take 1 Tab by mouth daily (with breakfast). Class: Normal  
 [id number] [address] Ph #: [phone] Route: Oral  
  
We Performed the Following AMB POC RAPID STREP A [46044 CPT(R)] CULTURE, STREP THROAT [id number] CPT(R)] POC HETEROPHILE ANTIBODY SCREEN [86001 CPT(R)] Follow-up Instructions Return if symptoms worsen or fail to improve. Introducing [facility]! Dear Parent or Guardian, Thank you for requesting a [employer] account for your child. With [employer], you can view your childs hospital or ER discharge instructions, current allergies, immunizations and much more. In order to access your childs information, we require a signed consent on file. Please see the [facility] department or call [phone] for instructions on completing a [employer] Proxy request.   
Additional Information If you have questions, please visit the Frequently Asked Questions section of the [employer] website at [email or web address] Remember, [employer] is NOT to be used for urgent needs.  For medical emergencies, dial 911. Now available from your iPhone and Android! Please provide this summary of care documentation to your next provider. Your primary care clinician is listed as Lj Lopez. If you have any questions after today's visit, please call [phone]. normal...

## 2022-12-12 NOTE — ED PROVIDER NOTE - CLINICAL SUMMARY MEDICAL DECISION MAKING FREE TEXT BOX
37F c PMH of sciatica and PSH of modified duodenal switch and hiatal hernia repair November 2022 p/w 1 month hx of lightheadedness that occurs after standing for extended periods of time intermittently that started after her surgery with associated "fullness" in her bilat ears. On exam, VS wnl, non-focal neuro exam, no remarkable exam findings.    ddx: electrolyte abnormality vs uti vs arrhythmia    Plan:  - labs  - ua  - ekg  - pregnancy test  - ivf  - reassess

## 2022-12-12 NOTE — ED PROVIDER NOTE - PROGRESS NOTE DETAILS
ekg nsr no devora/std hcg neg labs show phos 2.2 and ua with ketones otherwise labs wnl will replete phos and dc with surgery and pmd followup

## 2022-12-12 NOTE — ED ADULT NURSE NOTE - OBJECTIVE STATEMENT
Patient w/ PMHx sciatrica, dudenal switch and hernia repair last month, c/o intermittent lightheadedness and fullness of both ears, first started about a week after the surgery. Patient not feeling lightheadedness at this time. Ambulate without assistance. Patient denies sob, cp, n/v/d, focal weakness, LOC.

## 2022-12-12 NOTE — ED PROVIDER NOTE - OBJECTIVE STATEMENT
37F c PMH of sciatica and PSH of modified duodenal switch and hiatal hernia repair November 2022 p/w 1 month hx of lightheadedness that occurs after standing for extended periods of time intermittently that started after her surgery with associated "fullness" in her bilat ears. takes MVI. finished xarelto (post-op) yesterday.     denies f/c/n/v/d/abdominal pain/dysuria/hematuria/rash

## 2022-12-12 NOTE — ED PROVIDER NOTE - PHYSICAL EXAMINATION
NEURO: pupils 3 mm, PERRL, EOMI (CN III, IV, VI), facial sensation intact to light touch in all 3 divisions bilat (CN V), face is symmetric with normal eye closure, eye opening, and smile (CN VII), hearing is normal to rubbing fingers (CN VII), palate elevates symmetrically, phonation is normal (CN IX, X),  shoulder shrug intact bilat (CN XI), tongue is midline with nl movements and no atrophy (CN XII), finger to nose test nl bilat, negative pronator drift bilat,, speech is clear; 5/5 motor strength BUE and BLE: deltoids, biceps, triceps, wrist flexors/extensors, hand , hip flexors, knee flexors/extensors, plantar/dorsiflexors, hallux flexors/extensors; sensation intact to light touch BUE and BLE: C5-T1 and L3-S1   gait wnl

## 2022-12-15 ENCOUNTER — APPOINTMENT (OUTPATIENT)
Dept: INTERNAL MEDICINE | Facility: CLINIC | Age: 37
End: 2022-12-15

## 2022-12-15 PROCEDURE — 99442: CPT

## 2022-12-16 ENCOUNTER — APPOINTMENT (OUTPATIENT)
Dept: OTOLARYNGOLOGY | Facility: CLINIC | Age: 37
End: 2022-12-16

## 2022-12-16 ENCOUNTER — NON-APPOINTMENT (OUTPATIENT)
Age: 37
End: 2022-12-16

## 2022-12-16 VITALS
WEIGHT: 289 LBS | HEART RATE: 84 BPM | SYSTOLIC BLOOD PRESSURE: 122 MMHG | BODY MASS INDEX: 46.45 KG/M2 | DIASTOLIC BLOOD PRESSURE: 85 MMHG | HEIGHT: 66 IN | OXYGEN SATURATION: 98 %

## 2022-12-16 DIAGNOSIS — H93.8X3 OTHER SPECIFIED DISORDERS OF EAR, BILATERAL: ICD-10-CM

## 2022-12-16 PROCEDURE — 99204 OFFICE O/P NEW MOD 45 MIN: CPT | Mod: 25

## 2022-12-16 PROCEDURE — 31231 NASAL ENDOSCOPY DX: CPT

## 2022-12-16 NOTE — HISTORY OF PRESENT ILLNESS
[de-identified] : 12/16/22\par 37 year old female presents with ear clogging that started 1 month ago after she had bariatric surgery. She reports that initially she had bilateral ear clogging and felt that she was going to faint. She's had 3 episodes in the past month. Also reports heart palpitations with this. This occurred when she was doing her hair and cleaning. Ear clogging has been constant. Denies otalgia, otorrhea, hearing loss or tinnitus. She went to [facility] ER 12/12  and had CXR and EKG which were normal and was referred to us. Of note, she did lose 50lbs since her bariatric surgery about 5 weeks ago. No other ENT issues.

## 2022-12-16 NOTE — PROCEDURE
[FreeTextEntry6] : -\par Nasal Endoscopy Procedure Note\par \par Pre-operative Diagnosis: clogged ears\par Post-operative Diagnosis: normal exam\par Anesthesia: Topical\par Procedure: Bilateral nasal endoscopy\par  \par Procedure Details: \par After topical anesthesia and decongestant, the patient was placed in the supine position. The telescope was passed along the left nasal floor to the nasopharynx. It was then passed into the region of the middle meatus, middle turbinate, and the sphenoethmoid region. An identical procedure was performed on the right side. \par  \par Findings: \par Mucosa: 	 normal	\par Nasal septum: normal	\par Discharge: 	none	\par Turbinates: 	normal	\par Adenoid: 	 normal	\par Posterior choanae: 	normal	\par Eustachian tubes: 	normal	\par Mucous stranding: 	normal 	\par Lesions: 	 Not present	\par  \par Comments: \par Condition: Stable. Patient tolerated procedure well.\par

## 2022-12-16 NOTE — ASSESSMENT
[FreeTextEntry1] : 37 year old female with recent bariatric surgery 1 month ago presents with concern for ear clogging for 1 month. Of note, patient reports a 50lb weight loss since her bariatric surgery. \par \par Her exam today is normal. Based on her history and exam findings, I don’t believe her lightheadedness is inner ear related.  I do however suspect symptoms may be consistent with eustachian tube dysfunction due to recent weight loss. At this time, I am recommending an audio/tymp with eustachian tube testing to obtain further objective data. She will follow up with us after to review results and discuss next steps. \par \par - audio/tymp and eustachian tube testing\par - fu to review results and discuss next steps

## 2022-12-22 ENCOUNTER — APPOINTMENT (OUTPATIENT)
Dept: BARIATRICS | Facility: CLINIC | Age: 37
End: 2022-12-22

## 2022-12-22 VITALS — HEIGHT: 66 IN | WEIGHT: 285 LBS | BODY MASS INDEX: 45.8 KG/M2

## 2022-12-22 DIAGNOSIS — E66.01 MORBID (SEVERE) OBESITY DUE TO EXCESS CALORIES: ICD-10-CM

## 2022-12-22 PROCEDURE — 99024 POSTOP FOLLOW-UP VISIT: CPT

## 2022-12-30 ENCOUNTER — APPOINTMENT (OUTPATIENT)
Dept: OTOLARYNGOLOGY | Facility: CLINIC | Age: 37
End: 2022-12-30
Payer: COMMERCIAL

## 2022-12-30 PROCEDURE — 92550 TYMPANOMETRY & REFLEX THRESH: CPT | Mod: NC

## 2022-12-30 PROCEDURE — 92557 COMPREHENSIVE HEARING TEST: CPT | Mod: NC

## 2023-01-06 ENCOUNTER — APPOINTMENT (OUTPATIENT)
Dept: OTOLARYNGOLOGY | Facility: CLINIC | Age: 38
End: 2023-01-06
Payer: SELF-PAY

## 2023-01-06 DIAGNOSIS — H92.09 OTALGIA, UNSPECIFIED EAR: ICD-10-CM

## 2023-01-06 PROCEDURE — 99213 OFFICE O/P EST LOW 20 MIN: CPT | Mod: 95

## 2023-01-06 NOTE — REASON FOR VISIT
[Home] : at home, [unfilled] , at the time of the visit. [Medical Office: ([facility])___] : at the medical office located in  [Patient] : the patient [Subsequent Evaluation] : a subsequent evaluation for

## 2023-01-14 NOTE — DATA REVIEWED
[de-identified] : 12/30/2022\par \par Summary\par Hearing: WNL AU\par Tymps: Type A AU\par ETF: abnormal AU. \par

## 2023-01-14 NOTE — HISTORY OF PRESENT ILLNESS
[de-identified] : 12/16/22\par 37 year old female presents with ear clogging that started 1 month ago after she had bariatric surgery. She reports that initially she had bilateral ear clogging and felt that she was going to faint. She's had 3 episodes in the past month. Also reports heart palpitations with this. This occurred when she was doing her hair and cleaning. Ear clogging has been constant. Denies otalgia, otorrhea, hearing loss or tinnitus. She went to [facility] ER 12/12 and had CXR and EKG which were normal and was referred to us. Of note, she did lose 50lbs since her bariatric surgery about 5 weeks ago. No other ENT issues. \par  \par - [FreeTextEntry1] : 1/6/2023\par No significant change in symptoms.  No new ear, nose, throat symptoms.  Patient did complete audiogram and tympanogram as well as eustachian tube testing.  She presents to review those results today.

## 2023-01-14 NOTE — ASSESSMENT
[FreeTextEntry1] : 37 year old female with recent bariatric surgery 1 month ago presents with concern for ear clogging for 1 month. Of note, patient reports a 50lb weight loss since her bariatric surgery. \par \par Her exam today is normal. Based on her history and exam findings, I don’t believe her lightheadedness is inner ear related.  I do however suspect symptoms may be consistent with eustachian tube dysfunction due to recent weight loss. At this time, I am recommending an audio/tymp with eustachian tube testing to obtain further objective data. \par \par   Audiologic testing was completed with normal hearing bilaterally as well as normal tympanogram.  Eustachian tube testing was abnormal bilaterally however.  At this time I am recommending treatment for eustachian tube dysfunction including nasal saline and nasal steroids.  Also recommending nasal decongestant in the short-term.  Patient will follow-up in 1 month for repeat  audiological evaluation if symptoms resolve.  Should this be the case improvement on testing can consider eustachian tube balloon dilation versus PE tube placement.\par \par -   Station tube dysfunction handout given\par -  nasal saline, nasal steroids\par -  for acute nasal decongestant\par -  follow-up 1 month\par - repeat audio/tymp and eustachian tube testing\par - fu to review results and discuss next steps

## 2023-01-17 NOTE — PRE-OP CHECKLIST - LOOSE TEETH
[FreeTextEntry1] : Subjective findings\par This 86-year-old female presents with pain in the back radiating down the right leg to the heel.  Patient said the pain started approximately 3 months ago and has been slowly worsening since that time.  She does not remember a specific episode that brought on the pain but states that it has become very life limiting.  She has had 2 emergency room visits where the diagnosis of the pain was not determined.  Patient denies any bowel or bladder incontinence or any progressive weakness but states that the pain is problematic.  Patient was evaluated by her primary care who has sent her to us for evaluation of this pain.  The CV/Pulm/GI/Heme/Renal/Hepatic//Psych/Endo systems are reviewed. A full ROS was performed and reviewed with the patient today, see intake form.  Average pain score for the month is 7 out of ten. The patient's current medications are documented to the best of their ability. The patient has failed conservative therapies to include medical management, and physical activity to treat the pain. The patient has decreased function secondary to the pain.\par Objective findings\par There are no recent imaging studies of the lumbar spine.  Patient is able to get to a standing position with difficulty.  Patient ambulates with an antalgic gait.  Motor and sensory exams appear grossly intact.\par Assessment\par Rule out lumbar radiculopathy\par Plan\par Prior to any interventional pain management, the patient will obtain a CAT scan of the lumbar spine.  Patient is unable to obtain MRIs due to pacemaker.\par Spoke to patient about epidural steroid injections. Explained risks, benefits and alternatives including but not limited to the risk of infection, bleeding, headache, syncopal episode, failure to resolve issues, allergic reaction, symptom recurrence, allergic reaction, nerve injury, and increased pain. The patient understands the risks. All questions were answered. The patient is willing to proceed. The importance of increasing exercise after the injection is also stressed. The use of the injection as a jump start so that the patient can do more exercise, but that the exercise is the long term answer for the patient is reviewed. The patient states understanding.\par  no

## 2023-02-17 ENCOUNTER — APPOINTMENT (OUTPATIENT)
Dept: OTOLARYNGOLOGY | Facility: CLINIC | Age: 38
End: 2023-02-17

## 2023-06-22 NOTE — PRE-OP CHECKLIST - RESPIRATORY RATE (BREATHS/MIN)
[Patient reported PAP Smear was abnormal] : Patient reported PAP Smear was abnormal [Y] : Patient is sexually active [N] : Patient denies prior pregnancies [Regular Cycle Intervals] : periods have been regular [Frequency: Q ___ days] : menstrual periods occur approximately every [unfilled] days [Menarche Age: ____] : age at menarche was [unfilled] [Currently Active] : currently active [Men] : men [No] : No [Yes] : Yes [PapSmeardate] : 02/23 [TextBox_31] : LSIL+ Colposcopy 2/15/23 [LMPDate] : 05/19/23 [MensesFreq] : 21 [MensesLength] : 3 [FreeTextEntry1] : painful intercourse [FreeTextEntry3] : [place]uv[place]  18

## 2023-06-27 ENCOUNTER — APPOINTMENT (OUTPATIENT)
Dept: BARIATRICS | Facility: CLINIC | Age: 38
End: 2023-06-27

## 2023-07-13 ENCOUNTER — APPOINTMENT (OUTPATIENT)
Dept: BARIATRICS | Facility: CLINIC | Age: 38
End: 2023-07-13

## 2024-02-22 ENCOUNTER — APPOINTMENT (OUTPATIENT)
Dept: INTERNAL MEDICINE | Facility: CLINIC | Age: 39
End: 2024-02-22
Payer: COMMERCIAL

## 2024-02-22 VITALS
BODY MASS INDEX: 27.32 KG/M2 | SYSTOLIC BLOOD PRESSURE: 129 MMHG | WEIGHT: 172 LBS | DIASTOLIC BLOOD PRESSURE: 63 MMHG | HEIGHT: 66.5 IN | HEART RATE: 82 BPM | OXYGEN SATURATION: 99 % | TEMPERATURE: 98 F

## 2024-02-22 DIAGNOSIS — R09.82 POSTNASAL DRIP: ICD-10-CM

## 2024-02-22 DIAGNOSIS — Z78.9 OTHER SPECIFIED HEALTH STATUS: ICD-10-CM

## 2024-02-22 DIAGNOSIS — Z30.41 ENCOUNTER FOR SURVEILLANCE OF CONTRACEPTIVE PILLS: ICD-10-CM

## 2024-02-22 DIAGNOSIS — Z98.84 BARIATRIC SURGERY STATUS: ICD-10-CM

## 2024-02-22 DIAGNOSIS — Z00.00 ENCOUNTER FOR GENERAL ADULT MEDICAL EXAMINATION W/OUT ABNORMAL FINDINGS: ICD-10-CM

## 2024-02-22 PROCEDURE — 99395 PREV VISIT EST AGE 18-39: CPT | Mod: 25

## 2024-02-22 PROCEDURE — 99213 OFFICE O/P EST LOW 20 MIN: CPT | Mod: 25

## 2024-02-22 PROCEDURE — G0444 DEPRESSION SCREEN ANNUAL: CPT | Mod: 59

## 2024-02-22 PROCEDURE — 36415 COLL VENOUS BLD VENIPUNCTURE: CPT

## 2024-02-22 RX ORDER — FLUTICASONE PROPIONATE 50 UG/1
50 SPRAY, METERED NASAL TWICE DAILY
Qty: 1 | Refills: 0 | Status: ACTIVE | COMMUNITY
Start: 2024-02-22 | End: 1900-01-01

## 2024-02-22 NOTE — PHYSICAL EXAM
[No Acute Distress] : no acute distress [Well Nourished] : well nourished [Well Developed] : well developed [Well-Appearing] : well-appearing [Normal Sclera/Conjunctiva] : normal sclera/conjunctiva [EOMI] : extraocular movements intact [PERRL] : pupils equal round and reactive to light [Normal Outer Ear/Nose] : the outer ears and nose were normal in appearance [Normal Oropharynx] : the oropharynx was normal [No Lymphadenopathy] : no lymphadenopathy [No JVD] : no jugular venous distention [Supple] : supple [Thyroid Normal, No Nodules] : the thyroid was normal and there were no nodules present [No Accessory Muscle Use] : no accessory muscle use [No Respiratory Distress] : no respiratory distress  [Clear to Auscultation] : lungs were clear to auscultation bilaterally [Normal Rate] : normal rate  [Regular Rhythm] : with a regular rhythm [Normal S1, S2] : normal S1 and S2 [No Murmur] : no murmur heard [No Carotid Bruits] : no carotid bruits [No Abdominal Bruit] : a ~M bruit was not heard ~T in the abdomen [No Varicosities] : no varicosities [Pedal Pulses Present] : the pedal pulses are present [No Edema] : there was no peripheral edema [No Palpable Aorta] : no palpable aorta [No Extremity Clubbing/Cyanosis] : no extremity clubbing/cyanosis [Soft] : abdomen soft [Non Tender] : non-tender [Non-distended] : non-distended [No HSM] : no HSM [No Masses] : no abdominal mass palpated [Normal Bowel Sounds] : normal bowel sounds [Normal Anterior Cervical Nodes] : no anterior cervical lymphadenopathy [Normal Posterior Cervical Nodes] : no posterior cervical lymphadenopathy [No CVA Tenderness] : no CVA  tenderness [No Spinal Tenderness] : no spinal tenderness [No Joint Swelling] : no joint swelling [Grossly Normal Strength/Tone] : grossly normal strength/tone [No Rash] : no rash [Coordination Grossly Intact] : coordination grossly intact [No Focal Deficits] : no focal deficits [Normal Gait] : normal gait [Deep Tendon Reflexes (DTR)] : deep tendon reflexes were 2+ and symmetric [Normal Insight/Judgement] : insight and judgment were intact [Normal Affect] : the affect was normal

## 2024-02-23 LAB
ALBUMIN SERPL ELPH-MCNC: 4.2 G/DL
ALP BLD-CCNC: 56 U/L
ALT SERPL-CCNC: 16 U/L
ANION GAP SERPL CALC-SCNC: 10 MMOL/L
AST SERPL-CCNC: 17 U/L
BASOPHILS # BLD AUTO: 0.04 K/UL
BASOPHILS NFR BLD AUTO: 0.6 %
BILIRUB SERPL-MCNC: 0.4 MG/DL
BUN SERPL-MCNC: 7 MG/DL
CALCIUM SERPL-MCNC: 9.3 MG/DL
CHLORIDE SERPL-SCNC: 104 MMOL/L
CHOLEST SERPL-MCNC: 170 MG/DL
CO2 SERPL-SCNC: 25 MMOL/L
CREAT SERPL-MCNC: 0.8 MG/DL
EGFR: 98 ML/MIN/1.73M2
EOSINOPHIL # BLD AUTO: 0.25 K/UL
EOSINOPHIL NFR BLD AUTO: 3.5 %
ESTIMATED AVERAGE GLUCOSE: 100 MG/DL
FERRITIN SERPL-MCNC: 42 NG/ML
GLUCOSE SERPL-MCNC: 88 MG/DL
HBA1C MFR BLD HPLC: 5.1 %
HCT VFR BLD CALC: 36.2 %
HDLC SERPL-MCNC: 63 MG/DL
HGB BLD-MCNC: 11.7 G/DL
IMM GRANULOCYTES NFR BLD AUTO: 0.3 %
IRON SATN MFR SERPL: 9 %
IRON SERPL-MCNC: 26 UG/DL
LDLC SERPL CALC-MCNC: 96 MG/DL
LYMPHOCYTES # BLD AUTO: 2.84 K/UL
LYMPHOCYTES NFR BLD AUTO: 39.2 %
MAN DIFF?: NORMAL
MCHC RBC-ENTMCNC: 29.5 PG
MCHC RBC-ENTMCNC: 32.3 GM/DL
MCV RBC AUTO: 91.2 FL
MONOCYTES # BLD AUTO: 0.44 K/UL
MONOCYTES NFR BLD AUTO: 6.1 %
NEUTROPHILS # BLD AUTO: 3.65 K/UL
NEUTROPHILS NFR BLD AUTO: 50.3 %
NONHDLC SERPL-MCNC: 107 MG/DL
PLATELET # BLD AUTO: 263 K/UL
POTASSIUM SERPL-SCNC: 5 MMOL/L
PROT SERPL-MCNC: 6.6 G/DL
RBC # BLD: 3.97 M/UL
RBC # FLD: 14.3 %
SODIUM SERPL-SCNC: 139 MMOL/L
TIBC SERPL-MCNC: 297 UG/DL
TRANSFERRIN SERPL-MCNC: 233 MG/DL
TRIGL SERPL-MCNC: 55 MG/DL
UIBC SERPL-MCNC: 271 UG/DL
VIT B12 SERPL-MCNC: >2000 PG/ML
WBC # FLD AUTO: 7.24 K/UL

## 2024-03-05 NOTE — END OF VISIT
[] : Resident [FreeTextEntry3] : 38F with PMH of lap modified duodenal switch bariatric surgery on 11/8/22 who presents for her CPE HCM - labs today, refusing influenza vaccine, to see GYN for women's annual visit, reports UTD on STI screening  Post nasal drip - flonase, pulm toilet, supportive care

## 2024-03-05 NOTE — HEALTH RISK ASSESSMENT
[0] : 2) Feeling down, depressed, or hopeless: Not at all (0) [PHQ-2 Negative - No further assessment needed] : PHQ-2 Negative - No further assessment needed [[id number]] : 0

## 2024-03-05 NOTE — HISTORY OF PRESENT ILLNESS
[de-identified] : Ms. Bae is a 37 yo F with PMH of lap modified duodenal switch bariatric surgery on 11/8/22 who presents for her CPE. Since her last visit, she has been doing well but that for a month and a half the past has been suffering from nasal congestion/post nasal drip. She has been taking allegra and theraful day/night but with no relief. States that the sputum is green/thick in the AM, but that it gets better througout the day. Denies any fever/chills, n/v/d, abd pain, or skin changes. She has seen ENT and is pending decision of further intervention. Compliant with her medications -> multivitamins BID, calcium, Vitamin D BID, and b12 weekly. [FreeTextEntry1] : pt is here for cpe visit

## 2024-05-06 ENCOUNTER — APPOINTMENT (OUTPATIENT)
Dept: OTOLARYNGOLOGY | Facility: CLINIC | Age: 39
End: 2024-05-06
Payer: COMMERCIAL

## 2024-05-06 VITALS
HEART RATE: 79 BPM | BODY MASS INDEX: 27.32 KG/M2 | OXYGEN SATURATION: 96 % | HEIGHT: 66.5 IN | SYSTOLIC BLOOD PRESSURE: 118 MMHG | DIASTOLIC BLOOD PRESSURE: 83 MMHG | WEIGHT: 172 LBS

## 2024-05-06 DIAGNOSIS — R42 DIZZINESS AND GIDDINESS: ICD-10-CM

## 2024-05-06 PROCEDURE — 99215 OFFICE O/P EST HI 40 MIN: CPT

## 2024-05-06 NOTE — DATA REVIEWED
[de-identified] : 12/30/2022\par  \par  Summary\par  Hearing: WNL AU\par  Tymps: Type A AU\par  ETF: abnormal AU. \par

## 2024-05-06 NOTE — HISTORY OF PRESENT ILLNESS
[de-identified] : 12/16/22 37 year old female presents with ear clogging that started 1 month ago after she had bariatric surgery. She reports that initially she had bilateral ear clogging and felt that she was going to faint. She's had 3 episodes in the past month. Also reports heart palpitations with this. This occurred when she was doing her hair and cleaning. Ear clogging has been constant. Denies otalgia, otorrhea, hearing loss or tinnitus. She went to [facility] 12/12  and had CXR and EKG which were normal and was referred to us. Of note, she did lose 50lbs since her bariatric surgery about 5 weeks ago. No other ENT issues.  - 1/6/2023 No significant change in symptoms.  No new ear, nose, throat symptoms.  Patient did complete audiogram and tympanogram as well as eustachian tube testing.  She presents to review those results today. - [FreeTextEntry1] : 5/6/24 Patient lost 175 pounds after gastric bypass surgery Patient with similar symptoms from 1 year ago that started last month with several episodes.  feels intoxicated and light headed.  She will have a pressure change in the ears along with a burning sensation and flutter on the left.  She held onto a poll then she feel backwards.  No loss of consciousness.  She was able to get on the bus, but felt overheated and started sweating.  She then fell again.  These episodes will occur twice per month.  During the episodes, no change in hearing.  She states that the left-sided pulsatile tinnitus has actually been constant.

## 2024-05-06 NOTE — ASSESSMENT
[FreeTextEntry1] : - 37 year old female with recent bariatric surgery 1 month ago presents with concern for ear clogging for 1 month. Of note, patient reports a 50lb weight loss since her bariatric surgery.   Her exam today is normal. Based on her history and exam findings, I don't believe her lightheadedness is inner ear related.  I do however suspect symptoms may be consistent with eustachian tube dysfunction due to recent weight loss. At this time, I am recommending an audio/tymp with eustachian tube testing to obtain further objective data.     Audiologic testing was completed with normal hearing bilaterally as well as normal tympanogram.  Eustachian tube testing was abnormal bilaterally however.  At this time I am recommending treatment for eustachian tube dysfunction including nasal saline and nasal steroids.  Also recommending nasal decongestant in the short-term.  Patient will follow-up in 1 month for repeat  audiological evaluation if symptoms resolve.  Should this be the case improvement on testing can consider eustachian tube balloon dilation versus PE tube placement.  5/6/24 Symptoms do not appear to be otologic to me.  That being said recommending VNG to rule out a otologic process.  Additionally patient with persistent unilateral pulsatile tinnitus.  Recommending CTA head and neck to rule out a vascular malformation.  Patient have this completed and follow-up after to review those results.  - CTA head and neck - VNG -  follow-up 1 month

## 2024-05-10 ENCOUNTER — APPOINTMENT (OUTPATIENT)
Dept: OTOLARYNGOLOGY | Facility: CLINIC | Age: 39
End: 2024-05-10
Payer: COMMERCIAL

## 2024-05-10 PROCEDURE — 92540 BASIC VESTIBULAR EVALUATION: CPT

## 2024-05-10 PROCEDURE — 92537 CALORIC VSTBLR TEST W/REC: CPT

## 2024-05-19 ENCOUNTER — RESULT REVIEW (OUTPATIENT)
Age: 39
End: 2024-05-19

## 2024-05-19 ENCOUNTER — APPOINTMENT (OUTPATIENT)
Dept: CT IMAGING | Facility: HOSPITAL | Age: 39
End: 2024-05-19

## 2024-05-19 ENCOUNTER — OUTPATIENT (OUTPATIENT)
Dept: OUTPATIENT SERVICES | Facility: HOSPITAL | Age: 39
LOS: 1 days | End: 2024-05-19
Payer: COMMERCIAL

## 2024-05-19 PROCEDURE — 70498 CT ANGIOGRAPHY NECK: CPT

## 2024-05-19 PROCEDURE — 70496 CT ANGIOGRAPHY HEAD: CPT

## 2024-05-19 PROCEDURE — 70496 CT ANGIOGRAPHY HEAD: CPT | Mod: 26

## 2024-05-19 PROCEDURE — 70498 CT ANGIOGRAPHY NECK: CPT | Mod: 26

## 2024-05-20 ENCOUNTER — APPOINTMENT (OUTPATIENT)
Dept: OTOLARYNGOLOGY | Facility: CLINIC | Age: 39
End: 2024-05-20
Payer: COMMERCIAL

## 2024-05-20 VITALS
HEIGHT: 66 IN | SYSTOLIC BLOOD PRESSURE: 116 MMHG | HEART RATE: 65 BPM | DIASTOLIC BLOOD PRESSURE: 74 MMHG | BODY MASS INDEX: 27.64 KG/M2 | WEIGHT: 172 LBS | TEMPERATURE: 98.5 F

## 2024-05-20 DIAGNOSIS — H93.A2 PULSATILE TINNITUS, LEFT EAR: ICD-10-CM

## 2024-05-20 DIAGNOSIS — H81.92 UNSPECIFIED DISORDER OF VESTIBULAR FUNCTION, LEFT EAR: ICD-10-CM

## 2024-05-20 PROCEDURE — 99214 OFFICE O/P EST MOD 30 MIN: CPT

## 2024-05-20 NOTE — DATA REVIEWED
[de-identified] : VNG 5/10/2024 VNG test showed low level nystagmus in the head left and head body right test.  Due to the presence of low-level nystagmus in 3 positions cannot rule out a peripheral involvement. Bithermal caloric testing revealed borderline unilateral weakness on the left side. Consider vestibular therapy for possible peripheral involvement --------------------------------------------------- 12/30/2022  Summary Hearing: WNL AU Tymps: Type A AU ETF: abnormal AU.

## 2024-05-20 NOTE — HISTORY OF PRESENT ILLNESS
[de-identified] : 12/16/22 37 year old female presents with ear clogging that started 1 month ago after she had bariatric surgery. She reports that initially she had bilateral ear clogging and felt that she was going to faint. She's had 3 episodes in the past month. Also reports heart palpitations with this. This occurred when she was doing her hair and cleaning. Ear clogging has been constant. Denies otalgia, otorrhea, hearing loss or tinnitus. She went to [facility] ER 12/12  and had CXR and EKG which were normal and was referred to us. Of note, she did lose 50lbs since her bariatric surgery about 5 weeks ago. No other ENT issues.  - 1/6/2023 No significant change in symptoms.  No new ear, nose, throat symptoms.  Patient did complete audiogram and tympanogram as well as eustachian tube testing.  She presents to review those results today. - [FreeTextEntry1] : 5/6/24 Patient lost 175 pounds after gastric bypass surgery Patient with similar symptoms from 1 year ago that started last month with several episodes.  feels intoxicated and light headed.  She will have a pressure change in the ears along with a burning sensation and flutter on the left.  She held onto a poll then she feel backwards.  No loss of consciousness.  She was able to get on the bus, but felt overheated and started sweating.  She then fell again.  These episodes will occur twice per month.  During the episodes, no change in hearing.  She states that the left-sided pulsatile tinnitus has actually been constant.    5/20/2024: No significant changes in symptoms.  Patient did complete VNG as well as CTA and presents to review those results.  No new ENT issues otherwise.

## 2024-05-20 NOTE — ASSESSMENT
[FreeTextEntry1] : - 37 year old female with recent bariatric surgery 1 month ago presents with concern for ear clogging for 1 month. Of note, patient reports a 50lb weight loss since her bariatric surgery.   Her exam today is normal. Based on her history and exam findings, I don't believe her lightheadedness is inner ear related.  I do however suspect symptoms may be consistent with eustachian tube dysfunction due to recent weight loss. At this time, I am recommending an audio/tymp with eustachian tube testing to obtain further objective data.     Audiologic testing was completed with normal hearing bilaterally as well as normal tympanogram.  Eustachian tube testing was abnormal bilaterally however.  At this time I am recommending treatment for eustachian tube dysfunction including nasal saline and nasal steroids.  Also recommending nasal decongestant in the short-term.  Patient will follow-up in 1 month for repeat  audiological evaluation if symptoms resolve.  Should this be the case improvement on testing can consider eustachian tube balloon dilation versus PE tube placement.  5/6/24 Symptoms do not appear to be otologic to me.  That being said recommending VNG to rule out a otologic process.  Additionally patient with persistent unilateral pulsatile tinnitus.  Recommending CTA head and neck to rule out a vascular malformation.  Patient have this completed and follow-up after to review those results.  5/20/24: CTA head and neck was negative for any underlying vascular issue.  This was reviewed with the patient and she was reassured regarding pulsatile tinnitus.  If this persists can consider tinnitus retraining.  In terms of VNG there was evidence of a left unilateral vestibular dysfunction.  At this time I am recommending vestibular therapy.  Patient to follow-up in 2 months, sooner should symptoms worsen or fail to improve if symptoms persist beyond this would recommend consultation with neurology and potentially even MRI IAC  -Consider tinnitus training if pulsatile tinnitus continues -Vestibular therapy -  follow-up 2 month

## 2024-06-28 ENCOUNTER — APPOINTMENT (OUTPATIENT)
Dept: INTERNAL MEDICINE | Facility: CLINIC | Age: 39
End: 2024-06-28
Payer: COMMERCIAL

## 2024-06-28 VITALS
WEIGHT: 165 LBS | BODY MASS INDEX: 26.52 KG/M2 | HEIGHT: 66 IN | TEMPERATURE: 98.5 F | SYSTOLIC BLOOD PRESSURE: 114 MMHG | OXYGEN SATURATION: 98 % | HEART RATE: 66 BPM | DIASTOLIC BLOOD PRESSURE: 75 MMHG

## 2024-06-28 DIAGNOSIS — Z02.1 ENCOUNTER FOR PRE-EMPLOYMENT EXAMINATION: ICD-10-CM

## 2024-06-28 DIAGNOSIS — Z11.1 ENCOUNTER FOR SCREENING FOR RESPIRATORY TUBERCULOSIS: ICD-10-CM

## 2024-06-28 DIAGNOSIS — Z23 ENCOUNTER FOR IMMUNIZATION: ICD-10-CM

## 2024-06-28 DIAGNOSIS — Z00.01 ENCOUNTER FOR GENERAL ADULT MEDICAL EXAMINATION WITH ABNORMAL FINDINGS: ICD-10-CM

## 2024-06-28 PROCEDURE — 36415 COLL VENOUS BLD VENIPUNCTURE: CPT

## 2024-06-28 PROCEDURE — 99213 OFFICE O/P EST LOW 20 MIN: CPT | Mod: 25,GC

## 2024-07-02 ENCOUNTER — NON-APPOINTMENT (OUTPATIENT)
Age: 39
End: 2024-07-02

## 2024-07-03 ENCOUNTER — NON-APPOINTMENT (OUTPATIENT)
Age: 39
End: 2024-07-03

## 2024-07-05 ENCOUNTER — APPOINTMENT (OUTPATIENT)
Dept: INTERNAL MEDICINE | Facility: CLINIC | Age: 39
End: 2024-07-05
Payer: COMMERCIAL

## 2024-07-05 PROCEDURE — 36415 COLL VENOUS BLD VENIPUNCTURE: CPT

## 2024-07-08 ENCOUNTER — NON-APPOINTMENT (OUTPATIENT)
Age: 39
End: 2024-07-08

## 2024-07-11 ENCOUNTER — MED ADMIN CHARGE (OUTPATIENT)
Age: 39
End: 2024-07-11

## 2024-07-11 LAB
M TB IFN-G BLD-IMP: NEGATIVE
QUANTIFERON TB PLUS MITOGEN MINUS NIL: >10 IU/ML
QUANTIFERON TB PLUS NIL: 0.04 IU/ML
QUANTIFERON TB PLUS TB1 MINUS NIL: 0 IU/ML
QUANTIFERON TB PLUS TB2 MINUS NIL: 0 IU/ML

## 2024-07-30 ENCOUNTER — APPOINTMENT (OUTPATIENT)
Dept: INTERNAL MEDICINE | Facility: CLINIC | Age: 39
End: 2024-07-30
Payer: COMMERCIAL

## 2024-07-30 PROCEDURE — 36415 COLL VENOUS BLD VENIPUNCTURE: CPT

## 2024-08-01 ENCOUNTER — NON-APPOINTMENT (OUTPATIENT)
Age: 39
End: 2024-08-01

## 2024-08-01 LAB
MEV IGG FLD QL IA: >300 AU/ML
MEV IGG+IGM SER-IMP: POSITIVE
MUV AB SER-ACNC: POSITIVE
MUV IGG SER QL IA: 26.8 AU/ML
RUBV IGG FLD-ACNC: 2.54 INDEX
RUBV IGG SER-IMP: POSITIVE
VZV AB TITR SER: POSITIVE
VZV IGG SER IF-ACNC: 1563 INDEX

## (undated) DEVICE — STAPLER COVIDIEN ENDO GIA XL HANDLE

## (undated) DEVICE — TROCAR ETHICON ENDOPATH XCEL UNIVERSAL SLEEVE WITH OPTIVIEW 5MM X 100MM

## (undated) DEVICE — SUT PDS II 2-0 27" SH

## (undated) DEVICE — Device

## (undated) DEVICE — TROCAR ETHICON ENDOPATH XCEL UNIVERSAL SLEEVE 12MM X 100M STABILITY

## (undated) DEVICE — GLV 6.5 PROTEXIS (WHITE)

## (undated) DEVICE — D HELP - CLEARVIEW CLEARIFY SYSTEM

## (undated) DEVICE — SUT QUILL POLYPROPYLENE 1 15CM 22MM CLEAR

## (undated) DEVICE — LIGASURE MARYLAND 37CM

## (undated) DEVICE — SYR LUER LOK 30CC

## (undated) DEVICE — TROCAR ETHICON ENDOPATH XCEL BLADELESS 5MM X 150MM STABILITY

## (undated) DEVICE — POSITIONER SAGE MOBILITY TRANSFER PAD

## (undated) DEVICE — DRAPE 1/2 SHEET 40X57"

## (undated) DEVICE — STAPLER ECHELON FLEX POWERED PLUS 440MM

## (undated) DEVICE — DRAPE LEGGINGS XL

## (undated) DEVICE — TROCAR ETHICON ENDOPATH XCEL BLADELESS 12MM X 100MM STABILITY

## (undated) DEVICE — VENODYNE/SCD SLEEVE CALF BARIATRIC

## (undated) DEVICE — PACK GENERAL LAPAROSCOPY

## (undated) DEVICE — TIP METZENBAUM SCISSOR MONOPOLAR ENDOCUT (ORANGE)

## (undated) DEVICE — TROCAR ETHICON ENDOPATH XCEL BLADELESS 5MM X 100MM STABILITY

## (undated) DEVICE — SUT MONOCRYL 4-0 27" PS-2 UNDYED

## (undated) DEVICE — POSITIONER FOAM EGG CRATE ULNAR 2PCS (PINK)

## (undated) DEVICE — TROCAR ETHICON ENDOPATH XCEL BLADELESS 15MM X 100MM STABILITY

## (undated) DEVICE — ENDOCATCH II 15MM

## (undated) DEVICE — DRSG DERMABOND 0.7ML

## (undated) DEVICE — SUT VICRYL 0 54" TIES

## (undated) DEVICE — MARKING PEN W RULER

## (undated) DEVICE — SUT PDO 2-0 1/2 CIRCLE 26MM NDL 15CM

## (undated) DEVICE — DRAPE 3/4 SHEET 52X76"

## (undated) DEVICE — CATH NG SALEM SUMP 16FR

## (undated) DEVICE — TUBING STRYKER PNEUMOCLEAR HIGH FLOW